# Patient Record
Sex: FEMALE | Race: WHITE | Employment: OTHER | URBAN - METROPOLITAN AREA
[De-identification: names, ages, dates, MRNs, and addresses within clinical notes are randomized per-mention and may not be internally consistent; named-entity substitution may affect disease eponyms.]

---

## 2017-01-03 ENCOUNTER — TELEPHONE (OUTPATIENT)
Dept: INTERNAL MEDICINE CLINIC | Facility: CLINIC | Age: 67
End: 2017-01-03

## 2017-01-03 DIAGNOSIS — Z85.038 HISTORY OF COLON CANCER, STAGE I: Primary | ICD-10-CM

## 2017-01-03 NOTE — TELEPHONE ENCOUNTER
Received a call from central scheduling - Dr. Stephanie Olguin office patient is having a colonoscopy tomorrow morning but her insurance states that the referral needs to come from PCP office.  I called Dr Latrice Kelly office at [phone], and they are reques

## 2017-01-04 PROCEDURE — 88305 TISSUE EXAM BY PATHOLOGIST: CPT | Performed by: INTERNAL MEDICINE

## 2017-01-04 NOTE — TELEPHONE ENCOUNTER
Patient scheduled for a colonoscopy with Dr. Elke Wilder tomorrow at [place] [address], 01/04/2017, and patient changed insurance to [address] [place] [address]; I called [facility] at [phone], and spoke with the prior authorization department, and was told no PA required;  I spoke with

## 2017-01-05 ENCOUNTER — OFFICE VISIT (OUTPATIENT)
Dept: INTERNAL MEDICINE CLINIC | Facility: CLINIC | Age: 67
End: 2017-01-05

## 2017-01-05 VITALS
HEART RATE: 102 BPM | RESPIRATION RATE: 16 BRPM | DIASTOLIC BLOOD PRESSURE: 80 MMHG | SYSTOLIC BLOOD PRESSURE: 138 MMHG | BODY MASS INDEX: 20.01 KG/M2 | TEMPERATURE: 99 F | OXYGEN SATURATION: 98 % | HEIGHT: 61 IN | WEIGHT: 106 LBS

## 2017-01-05 DIAGNOSIS — R22.0 SWELLING OF LEFT SIDE OF FACE: ICD-10-CM

## 2017-01-05 DIAGNOSIS — Z28.21 INFLUENZA VACCINATION DECLINED BY PATIENT: ICD-10-CM

## 2017-01-05 DIAGNOSIS — Z72.0 TOBACCO ABUSE: ICD-10-CM

## 2017-01-05 DIAGNOSIS — J01.00 ACUTE NON-RECURRENT MAXILLARY SINUSITIS: Primary | ICD-10-CM

## 2017-01-05 PROCEDURE — 99214 OFFICE O/P EST MOD 30 MIN: CPT | Performed by: INTERNAL MEDICINE

## 2017-01-05 RX ORDER — PREDNISONE 20 MG/1
TABLET ORAL
Qty: 14 TABLET | Refills: 0 | Status: SHIPPED | OUTPATIENT
Start: 2017-01-05 | End: 2017-01-19 | Stop reason: ALTCHOICE

## 2017-01-05 RX ORDER — DOXYCYCLINE HYCLATE 100 MG
100 TABLET ORAL 2 TIMES DAILY
Qty: 20 TABLET | Refills: 0 | Status: SHIPPED | OUTPATIENT
Start: 2017-01-05 | End: 2017-01-19 | Stop reason: ALTCHOICE

## 2017-01-05 NOTE — PATIENT INSTRUCTIONS
Understanding Your Sinuses  Your sinuses are air-filled spaces between the bones in your head. They have small openings that connect to the nasal cavity. The sinuses make mucus that drains into the nose.  This helps keep the nose moist and free of dust an

## 2017-01-05 NOTE — PROGRESS NOTES
HPI:    Patient ID: Mark Johnson is a 73year old female. Swelling  This is a new problem. The current episode started today. The problem occurs constantly. The problem has been gradually worsening.  Pertinent negatives include no abdominal pain, Pcn [Penicillins]       Hives   PHYSICAL EXAM:   Physical Exam   Constitutional: She appears well-developed and well-nourished. No distress.    HENT:   Right Ear: Hearing and tympanic membrane normal.   Left Ear: Hearing, tympanic membrane and ear canal nor

## 2017-01-19 ENCOUNTER — OFFICE VISIT (OUTPATIENT)
Dept: INTERNAL MEDICINE CLINIC | Facility: CLINIC | Age: 67
End: 2017-01-19

## 2017-01-19 VITALS
RESPIRATION RATE: 16 BRPM | HEIGHT: 61 IN | OXYGEN SATURATION: 97 % | HEART RATE: 106 BPM | BODY MASS INDEX: 20.01 KG/M2 | SYSTOLIC BLOOD PRESSURE: 150 MMHG | TEMPERATURE: 98 F | DIASTOLIC BLOOD PRESSURE: 70 MMHG | WEIGHT: 106 LBS

## 2017-01-19 DIAGNOSIS — Z23 NEED FOR PNEUMOCOCCAL VACCINATION: ICD-10-CM

## 2017-01-19 DIAGNOSIS — Z13.31 DEPRESSION SCREENING: ICD-10-CM

## 2017-01-19 DIAGNOSIS — Z72.0 TOBACCO ABUSE: ICD-10-CM

## 2017-01-19 DIAGNOSIS — Z85.038 HISTORY OF COLON CANCER, STAGE I: ICD-10-CM

## 2017-01-19 DIAGNOSIS — Z12.31 ENCOUNTER FOR SCREENING MAMMOGRAM FOR MALIGNANT NEOPLASM OF BREAST: ICD-10-CM

## 2017-01-19 DIAGNOSIS — Z00.00 ENCOUNTER FOR ANNUAL HEALTH EXAMINATION: ICD-10-CM

## 2017-01-19 DIAGNOSIS — Z13.6 ENCOUNTER FOR ABDOMINAL AORTIC ANEURYSM (AAA) SCREENING: ICD-10-CM

## 2017-01-19 DIAGNOSIS — I10 ESSENTIAL HYPERTENSION: ICD-10-CM

## 2017-01-19 DIAGNOSIS — Z00.00 PHYSICAL EXAM, ANNUAL: Primary | ICD-10-CM

## 2017-01-19 DIAGNOSIS — F17.200 NICOTINE USE DISORDER: ICD-10-CM

## 2017-01-19 DIAGNOSIS — J30.1 NON-SEASONAL ALLERGIC RHINITIS DUE TO POLLEN: ICD-10-CM

## 2017-01-19 DIAGNOSIS — Z01.84 IMMUNITY STATUS TESTING: ICD-10-CM

## 2017-01-19 PROCEDURE — 99397 PER PM REEVAL EST PAT 65+ YR: CPT | Performed by: INTERNAL MEDICINE

## 2017-01-19 PROCEDURE — 90732 PPSV23 VACC 2 YRS+ SUBQ/IM: CPT | Performed by: INTERNAL MEDICINE

## 2017-01-19 PROCEDURE — G0402 INITIAL PREVENTIVE EXAM: HCPCS | Performed by: INTERNAL MEDICINE

## 2017-01-19 PROCEDURE — G0009 ADMIN PNEUMOCOCCAL VACCINE: HCPCS | Performed by: INTERNAL MEDICINE

## 2017-01-19 PROCEDURE — 96160 PT-FOCUSED HLTH RISK ASSMT: CPT | Performed by: INTERNAL MEDICINE

## 2017-01-19 PROCEDURE — 99406 BEHAV CHNG SMOKING 3-10 MIN: CPT | Performed by: INTERNAL MEDICINE

## 2017-01-19 NOTE — PATIENT INSTRUCTIONS
Prevention Guidelines, Women Ages 72 and Older  Screening tests and vaccines are an important part of managing your health. Health counseling is essential, too. Below are guidelines for these, for women ages 72 and older.  Talk with your healthcare provid Lung cancer Adults age 54 to [de-identified] who have smoked Yearly screening in smokers with 30 pack-year history of smoking or who quit within 15 years   Obesity All women in this age group At routine exams   Osteoporosis All women in this age group Bone density test Counseling Who needs it How often   Diet and exercise Women who are overweight or obese When diagnosed, and then at routine exams   Fall prevention (exercise and vitamin D supplements) All women in this age group At routine exams   Sexually transmitted inf ---------- Medicare covers annually or at 6-month intervals for prediabetic patients        Cardiovascular Disease Screening     Cholesterol, covered every 5 yrs including Total, LDL and Trigs LDL CHOLESTEROL (mg/dL)   Date Value   09/20/2016 73     CHOLES Biennial benefit unless patient has history of long-term glucocorticoid use for medical condition    No results found for this or any previous visit. No flowsheet data found.     Recommended for 2 year anniversary or as ordered in After Visit Summary   Pap Recommended Websites for Advanced Directives    [employer]. org/publications/Documents/personal_dec. pdf  An information packet, including necessary form from the [employer] website. http://www. [place]ph.state. [place]/public/books/advin CHOLESTEROL, TOTAL (mg/dL)   Date Value   09/20/2016 254*     TRIGLYCERIDES (mg/dL)   Date Value   09/20/2016 96        EKG - covered if needed at Welcome to Medicare, and non-screening if indicated for medical reasons Electrocardiogram date Routine EKG is Pap: Every 3 yrs age 21-65 or Pap+HPV every 5 yrs age 33-67, Covered every 2 yrs up to age 79 or Yearly if High Risk   There are no preventive care reminders to display for this patient.      Chlamydia  Annually if high risk No results found for: CHLAMYDIA A link to the [employer]. This site has a lot of good information including definitions of the different types of Advance Directives.  It also has the State forms available on it's website for anyone to review and print using their home

## 2017-01-19 NOTE — PROGRESS NOTES
HPI:   Cheryle Fanning is a 73year old female who presents for a MA (Medicare Advantage) [address] [facility] (Once per calendar year).     HRA:  Here for physical  No complaints  Denies sob or cp    Annual Physical due on 01/19/2018        Patient Care Team: mother; Other in her mother. SOCIAL HISTORY:   She  reports that she has been smoking. She has never used smokeless tobacco. She reports that she drinks alcohol. She reports that she does not use illicit drugs.      REVIEW OF SYSTEMS:   GENERAL: feels we enlarged, symmetric, no tenderness/mass/nodules; no carotid bruit or JVD   Back:   Symmetric, no curvature, ROM normal, no CVA tenderness   Lungs:   Clear to auscultation bilaterally, respirations unlabored   Heart:  Regular rate and rhythm, S1 and S2 norm Advanced Directive:  Living Will on file in [address]? Humberto Parham does not have a Living Will on file in [address]  Discussed with patient and provided information      Healthcare Power of Yonis on file in Epic:    Humberto Parham does not have Functional Status     Hearing Problems?: No    Vision Problems? : Yes    Difficulty walking?: No    Difficulty dressing or bathing?: No    Problems with daily activities? : No    Memory Problems?: No      Fall/Risk Assessment     Do you have 3 or more 74        EKG - w/ Initial Preventative Physical Exam only, or if medically necessary Electrocardiogram date       Colorectal Cancer Screening      Colonoscopy Screen every 10 years Colonoscopy,3 Years due on 01/04/2020 Update Health Maintenance if applica Date Value   09/20/2016 0.51*    No flowsheet data found. BUN  Annually BUN (mg/dL)   Date Value   09/20/2016 5*    No flowsheet data found. Drug Serum Conc  Annually No results found for: DIGOXIN, DIG, VALP No flowsheet data found.     Diabetes

## 2017-08-07 ENCOUNTER — HOSPITAL ENCOUNTER (OUTPATIENT)
Dept: MAMMOGRAPHY | Age: 67
Discharge: HOME | End: 2017-08-07
Attending: INTERNAL MEDICINE
Payer: MEDICARE

## 2017-08-07 ENCOUNTER — APPOINTMENT (OUTPATIENT)
Dept: LAB | Age: 67
End: 2017-08-07
Attending: INTERNAL MEDICINE
Payer: MEDICARE

## 2017-08-07 DIAGNOSIS — Z01.84 IMMUNITY STATUS TESTING: ICD-10-CM

## 2017-08-07 DIAGNOSIS — I10 ESSENTIAL HYPERTENSION: ICD-10-CM

## 2017-08-07 DIAGNOSIS — Z12.31 ENCOUNTER FOR SCREENING MAMMOGRAM FOR MALIGNANT NEOPLASM OF BREAST: ICD-10-CM

## 2017-08-07 LAB
ALBUMIN SERPL-MCNC: 4.2 G/DL (ref 3.5–4.8)
ALP LIVER SERPL-CCNC: 93 U/L (ref 50–130)
ALT SERPL-CCNC: 23 U/L (ref 14–54)
AST SERPL-CCNC: 22 U/L (ref 15–41)
BILIRUB SERPL-MCNC: 0.4 MG/DL (ref 0.1–2)
BILIRUB UR QL STRIP.AUTO: NEGATIVE
BUN BLD-MCNC: 4 MG/DL (ref 8–20)
CALCIUM BLD-MCNC: 9.2 MG/DL (ref 8.3–10.3)
CHLORIDE: 101 MMOL/L (ref 101–111)
CHOLEST SMN-MCNC: 276 MG/DL (ref ?–200)
CLARITY UR REFRACT.AUTO: CLEAR
CO2: 26 MMOL/L (ref 22–32)
CREAT BLD-MCNC: 0.52 MG/DL (ref 0.55–1.02)
GLUCOSE BLD-MCNC: 88 MG/DL (ref 70–99)
GLUCOSE UR STRIP.AUTO-MCNC: NEGATIVE MG/DL
HDLC SERPL-MCNC: 197 MG/DL (ref 45–?)
HDLC SERPL: 1.4 {RATIO} (ref ?–4.44)
HEPATITIS C VIRUS AB INTERPRETATION: NONREACTIVE
KETONES UR STRIP.AUTO-MCNC: NEGATIVE MG/DL
LDLC SERPL CALC-MCNC: 65 MG/DL (ref ?–130)
LDLC SERPL-MCNC: 14 MG/DL (ref 5–40)
LEUKOCYTE ESTERASE UR QL STRIP.AUTO: NEGATIVE
M PROTEIN MFR SERPL ELPH: 8 G/DL (ref 6.1–8.3)
NITRITE UR QL STRIP.AUTO: NEGATIVE
NONHDLC SERPL-MCNC: 79 MG/DL (ref ?–130)
PH UR STRIP.AUTO: 7 [PH] (ref 4.5–8)
POTASSIUM SERPL-SCNC: 3.9 MMOL/L (ref 3.6–5.1)
PROT UR STRIP.AUTO-MCNC: NEGATIVE MG/DL
RBC UR QL AUTO: NEGATIVE
SODIUM SERPL-SCNC: 135 MMOL/L (ref 136–144)
SP GR UR STRIP.AUTO: <1.005 (ref 1–1.03)
TRIGLYCERIDES: 71 MG/DL (ref ?–150)
TSI SER-ACNC: 1.08 MIU/ML (ref 0.35–5.5)
UROBILINOGEN UR STRIP.AUTO-MCNC: <2 MG/DL

## 2017-08-07 PROCEDURE — 86803 HEPATITIS C AB TEST: CPT | Performed by: INTERNAL MEDICINE

## 2017-08-07 PROCEDURE — 84443 ASSAY THYROID STIM HORMONE: CPT | Performed by: INTERNAL MEDICINE

## 2017-08-07 PROCEDURE — 81003 URINALYSIS AUTO W/O SCOPE: CPT | Performed by: INTERNAL MEDICINE

## 2017-08-07 PROCEDURE — 80061 LIPID PANEL: CPT | Performed by: INTERNAL MEDICINE

## 2017-08-07 PROCEDURE — 80053 COMPREHEN METABOLIC PANEL: CPT | Performed by: INTERNAL MEDICINE

## 2017-08-07 PROCEDURE — 36415 COLL VENOUS BLD VENIPUNCTURE: CPT | Performed by: INTERNAL MEDICINE

## 2017-08-07 PROCEDURE — 77067 SCR MAMMO BI INCL CAD: CPT | Performed by: INTERNAL MEDICINE

## 2017-08-22 ENCOUNTER — OFFICE VISIT (OUTPATIENT)
Dept: INTERNAL MEDICINE CLINIC | Facility: CLINIC | Age: 67
End: 2017-08-22

## 2017-08-22 VITALS
RESPIRATION RATE: 16 BRPM | HEART RATE: 94 BPM | DIASTOLIC BLOOD PRESSURE: 84 MMHG | BODY MASS INDEX: 17.72 KG/M2 | TEMPERATURE: 98 F | HEIGHT: 63 IN | SYSTOLIC BLOOD PRESSURE: 160 MMHG | WEIGHT: 100 LBS

## 2017-08-22 DIAGNOSIS — I10 ESSENTIAL HYPERTENSION: Primary | ICD-10-CM

## 2017-08-22 DIAGNOSIS — Z13.6 ENCOUNTER FOR ABDOMINAL AORTIC ANEURYSM (AAA) SCREENING: ICD-10-CM

## 2017-08-22 DIAGNOSIS — Z72.0 TOBACCO ABUSE: ICD-10-CM

## 2017-08-22 DIAGNOSIS — Z78.0 POSTMENOPAUSAL: ICD-10-CM

## 2017-08-22 PROCEDURE — 99213 OFFICE O/P EST LOW 20 MIN: CPT | Performed by: INTERNAL MEDICINE

## 2017-08-22 RX ORDER — LOSARTAN POTASSIUM 50 MG/1
50 TABLET ORAL DAILY
Qty: 30 TABLET | Refills: 0 | Status: SHIPPED | OUTPATIENT
Start: 2017-08-22 | End: 2017-09-05

## 2017-08-22 NOTE — PATIENT INSTRUCTIONS
Eating Heart-Healthy Foods  Eating has a big impact on your heart health. In fact, eating healthier can improve several of your heart risks at once. For instance, it helps you manage weight, cholesterol, and blood pressure.  Here are ideas to help you rober Aim to make these foods staples of your diet. If you have diabetes, you may have different recommendations than what is listed here:  · Fruits and vegetable provide plenty of nutrients without a lot of calories.  At meals, fill half your plate with these fo · Choose ingredients that spice up your food without adding calories, fat, or sodium. Try these items: horseradish, hot sauce, lemon, mustard, nonfat salad dressings, and vinegar.  For salt-free herbs and spices, try basil, cilantro, cinnamon, pepper, and r

## 2017-08-22 NOTE — PROGRESS NOTES
HPI:    Patient ID: Aquiles Fung is a 73year old female. HPI  Aquiles Fung is a 73year old female. HPI:   Patient presents for recheck of her hypertension.  Pt has been taking medications as instructed, no medication side effects, dread Rafaela  2002: HYSTERECTOMY      Comment: total hystero. 1974: [place] NEEDLE LOCALIZATION W/ SPECIMEN 1 SITE RIG* Right      Comment: cysts removed. : NEEDLE BIOPSY RIGHT Right      Comment: benign.   No date: [facility]  2002: OOPHORECTOMY Bilateral 50 MG Oral Tab Take 1 tablet (50 mg total) by mouth daily. Disp: 30 tablet Rfl: 0   Fluticasone Propionate (FLONASE NA) by Nasal route as needed. Disp:  Rfl:    loratadine (CLARITIN) 10 MG Oral Tab Take 10 mg by mouth daily as needed.    Disp:  Rfl:    Ps

## 2017-08-30 ENCOUNTER — HOSPITAL ENCOUNTER (OUTPATIENT)
Dept: ULTRASOUND IMAGING | Age: 67
Discharge: HOME | End: 2017-08-30
Attending: INTERNAL MEDICINE
Payer: MEDICARE

## 2017-08-30 ENCOUNTER — HOSPITAL ENCOUNTER (OUTPATIENT)
Dept: BONE DENSITY | Age: 67
Discharge: HOME | End: 2017-08-30
Attending: INTERNAL MEDICINE
Payer: MEDICARE

## 2017-08-30 DIAGNOSIS — Z72.0 TOBACCO ABUSE: ICD-10-CM

## 2017-08-30 DIAGNOSIS — Z13.6 ENCOUNTER FOR ABDOMINAL AORTIC ANEURYSM (AAA) SCREENING: ICD-10-CM

## 2017-08-30 DIAGNOSIS — Z78.0 POSTMENOPAUSAL: ICD-10-CM

## 2017-08-30 PROCEDURE — 77080 DXA BONE DENSITY AXIAL: CPT | Performed by: INTERNAL MEDICINE

## 2017-08-30 PROCEDURE — 76706 US ABDL AORTA SCREEN AAA: CPT | Performed by: INTERNAL MEDICINE

## 2017-08-31 ENCOUNTER — TELEPHONE (OUTPATIENT)
Dept: INTERNAL MEDICINE CLINIC | Facility: CLINIC | Age: 67
End: 2017-08-31

## 2017-08-31 DIAGNOSIS — M81.0 AGE-RELATED OSTEOPOROSIS WITHOUT CURRENT PATHOLOGICAL FRACTURE: Primary | ICD-10-CM

## 2017-08-31 DIAGNOSIS — Z91.89 10 YEAR RISK OF MI OR STROKE 7.5% OR GREATER: ICD-10-CM

## 2017-08-31 RX ORDER — ATORVASTATIN CALCIUM 10 MG/1
10 TABLET, FILM COATED ORAL NIGHTLY
Qty: 30 TABLET | Refills: 3 | Status: CANCELLED | OUTPATIENT
Start: 2017-08-31

## 2017-08-31 RX ORDER — ALENDRONATE SODIUM 70 MG/1
70 TABLET ORAL WEEKLY
Qty: 4 TABLET | Refills: 3 | Status: CANCELLED | OUTPATIENT
Start: 2017-08-31

## 2017-08-31 NOTE — TELEPHONE ENCOUNTER
----- Message from Molly Cheadle, MD sent at 8/31/2017  6:34 AM CDT -----  Dexa= osteoporosis.  Recommend start fosamax 70 mg q weekly to improve bone strength

## 2017-08-31 NOTE — TELEPHONE ENCOUNTER
----- Message from Jordy Simon MD sent at 8/31/2017  6:33 AM CDT -----  aaa screen   However there is a cholesterol plaque identified.  Due to hx of tobacco and age would recommend pt start low dose atorvastatin 10 mg daily to ensure no further progressio

## 2017-08-31 NOTE — TELEPHONE ENCOUNTER
Reviewed results with the pt in detail. The pt would like to wait to start any new medication after discussing this with Dr. Homero Borrero at [address] [place] [address] - [facility] appointment (9/5/2017).

## 2017-09-05 ENCOUNTER — OFFICE VISIT (OUTPATIENT)
Dept: INTERNAL MEDICINE CLINIC | Facility: CLINIC | Age: 67
End: 2017-09-05

## 2017-09-05 VITALS
HEART RATE: 74 BPM | OXYGEN SATURATION: 97 % | TEMPERATURE: 98 F | SYSTOLIC BLOOD PRESSURE: 135 MMHG | HEIGHT: 63 IN | DIASTOLIC BLOOD PRESSURE: 70 MMHG | WEIGHT: 102 LBS | BODY MASS INDEX: 18.07 KG/M2 | RESPIRATION RATE: 16 BRPM

## 2017-09-05 DIAGNOSIS — Z72.0 TOBACCO ABUSE: ICD-10-CM

## 2017-09-05 DIAGNOSIS — I10 ESSENTIAL HYPERTENSION: Primary | ICD-10-CM

## 2017-09-05 DIAGNOSIS — Z91.89 10 YEAR RISK OF MI OR STROKE 7.5% OR GREATER: ICD-10-CM

## 2017-09-05 DIAGNOSIS — M81.0 AGE-RELATED OSTEOPOROSIS WITHOUT CURRENT PATHOLOGICAL FRACTURE: ICD-10-CM

## 2017-09-05 DIAGNOSIS — Z28.21 INFLUENZA VACCINATION DECLINED BY PATIENT: ICD-10-CM

## 2017-09-05 PROCEDURE — 99214 OFFICE O/P EST MOD 30 MIN: CPT | Performed by: INTERNAL MEDICINE

## 2017-09-05 RX ORDER — ALENDRONATE SODIUM 70 MG/1
70 TABLET ORAL WEEKLY
Qty: 12 TABLET | Refills: 1 | Status: SHIPPED | OUTPATIENT
Start: 2017-09-05 | End: 2018-02-12

## 2017-09-05 RX ORDER — LOSARTAN POTASSIUM 50 MG/1
50 TABLET ORAL DAILY
Qty: 90 TABLET | Refills: 1 | Status: SHIPPED | OUTPATIENT
Start: 2017-09-05 | End: 2018-02-16

## 2017-09-05 RX ORDER — ATORVASTATIN CALCIUM 10 MG/1
10 TABLET, FILM COATED ORAL DAILY
Qty: 90 TABLET | Refills: 1 | Status: SHIPPED | OUTPATIENT
Start: 2017-09-05 | End: 2018-02-16

## 2017-09-05 NOTE — PROGRESS NOTES
Yoel Edwards is a 73year old female. HPI:   Patient presents for recheck of her hypertension. Pt has been taking medications as instructed, no medication side effects, home BP monitoring in the range of 057'Q systolic and 26'N diastolic.     Wt Surgical History:  1/4/17: COLONOSCOPY  1/4/2017: COLONOSCOPY N/A      Comment: Procedure: COLONOSCOPY, POSSIBLE BIOPSY,                POSSIBLE POLYPECTOMY 75160;  Surgeon: Andre Gallegos MD;  Location: [address] [place] [address]  2002: Coy Gallegos Fosamax therapy initiated. Discussed potential side effects of the medication  -Smooth plaque noted in the abdominal aorta on ultrasound. Statin therapy initiated. -Check echo   The patient indicates understanding of these issues and agrees to the plan.

## 2017-09-22 ENCOUNTER — HOSPITAL ENCOUNTER (OUTPATIENT)
Dept: CV DIAGNOSTICS | Facility: HOSPITAL | Age: 67
Discharge: HOME | End: 2017-09-22
Attending: INTERNAL MEDICINE
Payer: MEDICARE

## 2017-09-22 DIAGNOSIS — I10 ESSENTIAL HYPERTENSION: ICD-10-CM

## 2017-09-22 PROCEDURE — 93306 TTE W/DOPPLER COMPLETE: CPT | Performed by: INTERNAL MEDICINE

## 2018-01-29 ENCOUNTER — TELEPHONE (OUTPATIENT)
Dept: INTERNAL MEDICINE CLINIC | Facility: CLINIC | Age: 68
End: 2018-01-29

## 2018-01-29 ENCOUNTER — OFFICE VISIT (OUTPATIENT)
Dept: INTERNAL MEDICINE CLINIC | Facility: CLINIC | Age: 68
End: 2018-01-29

## 2018-01-29 VITALS
HEIGHT: 63 IN | BODY MASS INDEX: 17.54 KG/M2 | SYSTOLIC BLOOD PRESSURE: 112 MMHG | DIASTOLIC BLOOD PRESSURE: 82 MMHG | TEMPERATURE: 98 F | HEART RATE: 110 BPM | OXYGEN SATURATION: 98 % | WEIGHT: 99 LBS | RESPIRATION RATE: 18 BRPM

## 2018-01-29 DIAGNOSIS — F17.200 TOBACCO DEPENDENCE: ICD-10-CM

## 2018-01-29 DIAGNOSIS — J01.00 ACUTE NON-RECURRENT MAXILLARY SINUSITIS: Primary | ICD-10-CM

## 2018-01-29 DIAGNOSIS — I10 ESSENTIAL HYPERTENSION: Primary | ICD-10-CM

## 2018-01-29 PROCEDURE — 99214 OFFICE O/P EST MOD 30 MIN: CPT | Performed by: INTERNAL MEDICINE

## 2018-01-29 RX ORDER — DOXYCYCLINE HYCLATE 100 MG
100 TABLET ORAL 2 TIMES DAILY
Qty: 20 TABLET | Refills: 0 | Status: SHIPPED | OUTPATIENT
Start: 2018-01-29 | End: 2018-03-06 | Stop reason: ALTCHOICE

## 2018-01-29 RX ORDER — PREDNISONE 20 MG/1
TABLET ORAL
Qty: 14 TABLET | Refills: 0 | Status: SHIPPED | OUTPATIENT
Start: 2018-01-29 | End: 2018-03-06 | Stop reason: ALTCHOICE

## 2018-01-29 NOTE — PROGRESS NOTES
HPI:    Patient ID: Sho Mark is a 78year old female. Sinus Problem   This is a new problem. Episode onset: 4 weeks. The problem has been gradually worsening since onset. There has been no fever. Her pain is at a severity of 7/10.  The pain is (CLARITIN) 10 MG Oral Tab Take 10 mg by mouth daily as needed. Disp:  Rfl:    Pseudoephedrine HCl (SUDAFED) 30 MG Oral Tab Take 30 mg by mouth every 4 (four) hours as needed for congestion.  Disp:  Rfl:      Allergies:  Aspirin                 Hives  Dair

## 2018-01-29 NOTE — TELEPHONE ENCOUNTER
Please call patient to confirm that she will need to get labs for her HRA . Patient was not sure if she would need them.

## 2018-02-01 ENCOUNTER — MED REC SCAN ONLY (OUTPATIENT)
Dept: INTERNAL MEDICINE CLINIC | Facility: CLINIC | Age: 68
End: 2018-02-01

## 2018-02-12 DIAGNOSIS — M81.0 AGE-RELATED OSTEOPOROSIS WITHOUT CURRENT PATHOLOGICAL FRACTURE: Primary | ICD-10-CM

## 2018-02-12 RX ORDER — ALENDRONATE SODIUM 70 MG/1
TABLET ORAL
Qty: 12 TABLET | Refills: 3 | Status: SHIPPED | OUTPATIENT
Start: 2018-02-12 | End: 2018-10-30

## 2018-02-16 DIAGNOSIS — Z91.89 10 YEAR RISK OF MI OR STROKE 7.5% OR GREATER: ICD-10-CM

## 2018-02-16 DIAGNOSIS — I10 ESSENTIAL HYPERTENSION: Primary | ICD-10-CM

## 2018-02-16 RX ORDER — LOSARTAN POTASSIUM 50 MG/1
TABLET ORAL
Qty: 90 TABLET | Refills: 1 | Status: SHIPPED | OUTPATIENT
Start: 2018-02-16 | End: 2018-08-09

## 2018-02-16 RX ORDER — ATORVASTATIN CALCIUM 10 MG/1
TABLET, FILM COATED ORAL
Qty: 90 TABLET | Refills: 1 | Status: SHIPPED | OUTPATIENT
Start: 2018-02-16 | End: 2018-08-09

## 2018-03-01 ENCOUNTER — APPOINTMENT (OUTPATIENT)
Dept: LAB | Age: 68
End: 2018-03-01
Attending: INTERNAL MEDICINE
Payer: MEDICARE

## 2018-03-01 DIAGNOSIS — I10 ESSENTIAL HYPERTENSION: ICD-10-CM

## 2018-03-01 PROCEDURE — 80061 LIPID PANEL: CPT

## 2018-03-01 PROCEDURE — 36415 COLL VENOUS BLD VENIPUNCTURE: CPT

## 2018-03-01 PROCEDURE — 80053 COMPREHEN METABOLIC PANEL: CPT

## 2018-03-01 PROCEDURE — 81003 URINALYSIS AUTO W/O SCOPE: CPT

## 2018-03-06 ENCOUNTER — OFFICE VISIT (OUTPATIENT)
Dept: INTERNAL MEDICINE CLINIC | Facility: CLINIC | Age: 68
End: 2018-03-06

## 2018-03-06 VITALS
HEART RATE: 80 BPM | RESPIRATION RATE: 18 BRPM | SYSTOLIC BLOOD PRESSURE: 130 MMHG | TEMPERATURE: 99 F | WEIGHT: 98 LBS | HEIGHT: 63 IN | DIASTOLIC BLOOD PRESSURE: 80 MMHG | BODY MASS INDEX: 17.36 KG/M2 | OXYGEN SATURATION: 95 %

## 2018-03-06 DIAGNOSIS — M81.0 AGE-RELATED OSTEOPOROSIS WITHOUT CURRENT PATHOLOGICAL FRACTURE: ICD-10-CM

## 2018-03-06 DIAGNOSIS — F17.200 TOBACCO DEPENDENCE: ICD-10-CM

## 2018-03-06 DIAGNOSIS — Z85.038 HISTORY OF COLON CANCER, STAGE I: ICD-10-CM

## 2018-03-06 DIAGNOSIS — Z00.00 ENCOUNTER FOR ANNUAL HEALTH EXAMINATION: ICD-10-CM

## 2018-03-06 DIAGNOSIS — Z91.89 10 YEAR RISK OF MI OR STROKE 7.5% OR GREATER: ICD-10-CM

## 2018-03-06 DIAGNOSIS — I10 ESSENTIAL HYPERTENSION: ICD-10-CM

## 2018-03-06 DIAGNOSIS — Z00.00 PHYSICAL EXAM, ANNUAL: Primary | ICD-10-CM

## 2018-03-06 DIAGNOSIS — J30.1 NON-SEASONAL ALLERGIC RHINITIS DUE TO POLLEN: ICD-10-CM

## 2018-03-06 DIAGNOSIS — E22.2 SIADH (SYNDROME OF INAPPROPRIATE ADH PRODUCTION) (HCC): ICD-10-CM

## 2018-03-06 DIAGNOSIS — Z87.891 PERSONAL HISTORY OF TOBACCO USE, PRESENTING HAZARDS TO HEALTH: ICD-10-CM

## 2018-03-06 DIAGNOSIS — Z28.21 INFLUENZA VACCINATION DECLINED BY PATIENT: ICD-10-CM

## 2018-03-06 PROCEDURE — 99406 BEHAV CHNG SMOKING 3-10 MIN: CPT | Performed by: INTERNAL MEDICINE

## 2018-03-06 PROCEDURE — 96160 PT-FOCUSED HLTH RISK ASSMT: CPT | Performed by: INTERNAL MEDICINE

## 2018-03-06 PROCEDURE — G0438 PPPS, INITIAL VISIT: HCPCS | Performed by: INTERNAL MEDICINE

## 2018-03-06 NOTE — PROGRESS NOTES
HPI:   Aquiles Fung is a 78year old female who presents for a MA (Medicare Advantage) [address] [facility] (Once per calendar year).     HPI:  Here for physical  Reports normal state of health  Denies cp or sob  Adherent with meds    PAST MEDICAL, SOCIAL, FAM - General (Internal Medicine)    Patient Active Problem List:     History of colon cancer, stage I sigmoid, 2013 s/p resection     Non-seasonal allergic rhinitis due to pollen     Essential hypertension     Age-related osteoporosis without current patholog ().     She  has a past surgical history that includes colonoscopy (17); ; tonsillectomy (was a child in grade school); other surgical history (cysts removed from breast, colon cancer surgery); colonoscopy (N/A, 2017); hysterectomy (); o Corrected Right Eye Chart Acuity: 20/20   Left Eye Visual Acuity: Corrected Left Eye Chart Acuity: 20/20   Both Eyes Visual Acuity: Corrected Both Eyes Chart Acuity: 20/20   Able To Tolerate Visual Acuity: Yes      General Appearance:  Alert, cooperative, allergic rhinitis due to pollen    Essential hypertension    Age-related osteoporosis without current pathological fracture    10 year risk of MI or stroke 7.5% or greater    Tobacco dependence    SIADH (syndrome of inappropriate ADH production) ([facility] [address] 03/01/2018 89   ----------       Cardiovascular Disease Screening     LDL Annually LDL Cholesterol (mg/dL)   Date Value   03/01/2018 55        EKG - w/ Initial Preventative Physical Exam only, or if medically necessary Electrocardiogram date       Colore Homosexual men   Illicit injectable drug abusers     Tetanus Toxoid  Only covered with a cut with metal- TD and TDaP Not covered by Medicare Part B No vaccine history found This may be covered with your prescription benefits, but Medicare does not cover

## 2018-03-06 NOTE — PATIENT INSTRUCTIONS
Low-Salt Choices  Eating salt (sodium) can make your body retain too much water. Excess water makes your heart work harder. Canned, packaged, and frozen foods are easy to prepare. But they are often high in sodium.  Here are some ideas for low-salt foods Tests on this list are recommended by your physician but may not be covered, or covered at this frequency, by your insurer. Please check with your insurance carrier before scheduling to verify coverage.    PREVENTATIVE SERVICES  INDICATIONS AND SCHEDULE Int Covered every 10 years- more often if abnormal Colonoscopy,3 Years due on 01/04/2020 Update Health Maintenance if applicable    Flex Sigmoidoscopy Screen  Covered every 5 years No results found for this or any previous visit. No flowsheet data found.      Nicola Lynne Pneumococcal 23 (Pneumovax)  Covered Once after 72   Orders placed or performed in visit on 01/19/17  -PNEUMOCOCCAL IMM (PNEUMOVAX)    Please get once after your 65th birthday    Hepatitis B for Moderate/High Risk       No orders found for this or any pre

## 2018-08-09 DIAGNOSIS — I10 ESSENTIAL HYPERTENSION: ICD-10-CM

## 2018-08-09 DIAGNOSIS — Z91.89 10 YEAR RISK OF MI OR STROKE 7.5% OR GREATER: ICD-10-CM

## 2018-08-09 RX ORDER — ATORVASTATIN CALCIUM 10 MG/1
TABLET, FILM COATED ORAL
Qty: 90 TABLET | Refills: 1 | Status: SHIPPED | OUTPATIENT
Start: 2018-08-09 | End: 2018-08-31

## 2018-08-09 RX ORDER — LOSARTAN POTASSIUM 50 MG/1
TABLET ORAL
Qty: 90 TABLET | Refills: 1 | Status: SHIPPED | OUTPATIENT
Start: 2018-08-09 | End: 2018-10-30

## 2018-08-14 ENCOUNTER — TELEPHONE (OUTPATIENT)
Dept: INTERNAL MEDICINE CLINIC | Facility: CLINIC | Age: 68
End: 2018-08-14

## 2018-08-14 DIAGNOSIS — Z12.31 ENCOUNTER FOR SCREENING MAMMOGRAM FOR MALIGNANT NEOPLASM OF BREAST: Primary | ICD-10-CM

## 2018-08-14 NOTE — TELEPHONE ENCOUNTER
Patient is requesting an order for a mammogram. No need to inform patient because I informed she may call central scheduling this afternoon.

## 2018-08-16 ENCOUNTER — HOSPITAL ENCOUNTER (OUTPATIENT)
Dept: MAMMOGRAPHY | Age: 68
Discharge: HOME | End: 2018-08-16
Attending: INTERNAL MEDICINE
Payer: MEDICARE

## 2018-08-16 ENCOUNTER — TELEPHONE (OUTPATIENT)
Dept: INTERNAL MEDICINE CLINIC | Facility: CLINIC | Age: 68
End: 2018-08-16

## 2018-08-16 DIAGNOSIS — I10 ESSENTIAL HYPERTENSION: Primary | ICD-10-CM

## 2018-08-16 DIAGNOSIS — Z12.31 ENCOUNTER FOR SCREENING MAMMOGRAM FOR MALIGNANT NEOPLASM OF BREAST: ICD-10-CM

## 2018-08-16 DIAGNOSIS — Z91.89 10 YEAR RISK OF MI OR STROKE 7.5% OR GREATER: ICD-10-CM

## 2018-08-16 PROCEDURE — 77063 BREAST TOMOSYNTHESIS BI: CPT | Performed by: INTERNAL MEDICINE

## 2018-08-16 PROCEDURE — 77067 SCR MAMMO BI INCL CAD: CPT | Performed by: INTERNAL MEDICINE

## 2018-08-16 NOTE — TELEPHONE ENCOUNTER
CBC, CMP, Lipid and UA orders entered for [employer]. The pt was informed of this and verbalized understanding.

## 2018-08-16 NOTE — TELEPHONE ENCOUNTER
Please call patient she is wondering if she needs to have blood work done for her 6 month fu in September. Patient says please leave a message if she is not home.

## 2018-08-28 ENCOUNTER — LAB ENCOUNTER (OUTPATIENT)
Dept: LAB | Age: 68
End: 2018-08-28
Attending: INTERNAL MEDICINE
Payer: MEDICARE

## 2018-08-28 DIAGNOSIS — I10 ESSENTIAL HYPERTENSION: ICD-10-CM

## 2018-08-28 DIAGNOSIS — Z91.89 10 YEAR RISK OF MI OR STROKE 7.5% OR GREATER: ICD-10-CM

## 2018-08-28 LAB
ALBUMIN SERPL-MCNC: 4 G/DL (ref 3.5–4.8)
ALBUMIN/GLOB SERPL: 1.1 {RATIO} (ref 1–2)
ALP LIVER SERPL-CCNC: 85 U/L (ref 55–142)
ALT SERPL-CCNC: 39 U/L (ref 14–54)
ANION GAP SERPL CALC-SCNC: 9 MMOL/L (ref 0–18)
AST SERPL-CCNC: 38 U/L (ref 15–41)
BASOPHILS # BLD AUTO: 0.02 X10(3) UL (ref 0–0.1)
BASOPHILS NFR BLD AUTO: 0.3 %
BILIRUB SERPL-MCNC: 0.4 MG/DL (ref 0.1–2)
BILIRUB UR QL STRIP.AUTO: NEGATIVE
BUN BLD-MCNC: 5 MG/DL (ref 8–20)
BUN/CREAT SERPL: 10.9 (ref 10–20)
CALCIUM BLD-MCNC: 8.9 MG/DL (ref 8.3–10.3)
CHLORIDE SERPL-SCNC: 96 MMOL/L (ref 101–111)
CHOLEST SMN-MCNC: 246 MG/DL (ref ?–200)
CLARITY UR REFRACT.AUTO: CLEAR
CO2 SERPL-SCNC: 26 MMOL/L (ref 22–32)
COLOR UR AUTO: YELLOW
CREAT BLD-MCNC: 0.46 MG/DL (ref 0.55–1.02)
EOSINOPHIL # BLD AUTO: 0.07 X10(3) UL (ref 0–0.3)
EOSINOPHIL NFR BLD AUTO: 1.1 %
ERYTHROCYTE [DISTWIDTH] IN BLOOD BY AUTOMATED COUNT: 12.9 % (ref 11.5–16)
GLOBULIN PLAS-MCNC: 3.5 G/DL (ref 2.5–4)
GLUCOSE BLD-MCNC: 78 MG/DL (ref 70–99)
GLUCOSE UR STRIP.AUTO-MCNC: NEGATIVE MG/DL
HCT VFR BLD AUTO: 42.8 % (ref 34–50)
HDLC SERPL-MCNC: 190 MG/DL (ref 40–59)
HGB BLD-MCNC: 14.5 G/DL (ref 12–16)
IMMATURE GRANULOCYTE COUNT: 0.02 X10(3) UL (ref 0–1)
IMMATURE GRANULOCYTE RATIO %: 0.3 %
KETONES UR STRIP.AUTO-MCNC: NEGATIVE MG/DL
LDLC SERPL CALC-MCNC: 42 MG/DL (ref ?–100)
LEUKOCYTE ESTERASE UR QL STRIP.AUTO: NEGATIVE
LYMPHOCYTES # BLD AUTO: 0.85 X10(3) UL (ref 0.9–4)
LYMPHOCYTES NFR BLD AUTO: 13.9 %
M PROTEIN MFR SERPL ELPH: 7.5 G/DL (ref 6.1–8.3)
MCH RBC QN AUTO: 32.2 PG (ref 27–33.2)
MCHC RBC AUTO-ENTMCNC: 33.9 G/DL (ref 31–37)
MCV RBC AUTO: 94.9 FL (ref 81–100)
MONOCYTES # BLD AUTO: 0.47 X10(3) UL (ref 0.1–1)
MONOCYTES NFR BLD AUTO: 7.7 %
NEUTROPHIL ABS PRELIM: 4.69 X10 (3) UL (ref 1.3–6.7)
NEUTROPHILS # BLD AUTO: 4.69 X10(3) UL (ref 1.3–6.7)
NEUTROPHILS NFR BLD AUTO: 76.7 %
NITRITE UR QL STRIP.AUTO: NEGATIVE
NONHDLC SERPL-MCNC: 56 MG/DL (ref ?–130)
OSMOLALITY SERPL CALC.SUM OF ELEC: 268 MOSM/KG (ref 275–295)
PH UR STRIP.AUTO: 6 [PH] (ref 4.5–8)
PLATELET # BLD AUTO: 267 10(3)UL (ref 150–450)
POTASSIUM SERPL-SCNC: 3.9 MMOL/L (ref 3.6–5.1)
PROT UR STRIP.AUTO-MCNC: NEGATIVE MG/DL
RBC # BLD AUTO: 4.51 X10(6)UL (ref 3.8–5.1)
RBC UR QL AUTO: NEGATIVE
RED CELL DISTRIBUTION WIDTH-SD: 45.7 FL (ref 35.1–46.3)
SODIUM SERPL-SCNC: 131 MMOL/L (ref 136–144)
SP GR UR STRIP.AUTO: <1.005 (ref 1–1.03)
TRIGL SERPL-MCNC: 71 MG/DL (ref 30–149)
UROBILINOGEN UR STRIP.AUTO-MCNC: <2 MG/DL
VLDLC SERPL CALC-MCNC: 14 MG/DL (ref 0–30)
WBC # BLD AUTO: 6.1 X10(3) UL (ref 4–13)

## 2018-08-28 PROCEDURE — 80061 LIPID PANEL: CPT

## 2018-08-28 PROCEDURE — 36415 COLL VENOUS BLD VENIPUNCTURE: CPT

## 2018-08-28 PROCEDURE — 81003 URINALYSIS AUTO W/O SCOPE: CPT

## 2018-08-28 PROCEDURE — 85025 COMPLETE CBC W/AUTO DIFF WBC: CPT

## 2018-08-28 PROCEDURE — 80053 COMPREHEN METABOLIC PANEL: CPT

## 2018-08-31 ENCOUNTER — TELEPHONE (OUTPATIENT)
Dept: INTERNAL MEDICINE CLINIC | Facility: CLINIC | Age: 68
End: 2018-08-31

## 2018-08-31 NOTE — TELEPHONE ENCOUNTER
Patient says that since she has started taking atorvastatin she has been experiencing constipation and bloating - can we recommend a different medication? Ok to leave a detailed VM if she does not answer.

## 2018-08-31 NOTE — TELEPHONE ENCOUNTER
The pt called to report having severe constipation and gas after taking atorvastatin 10mg. The pt states has started atorvastatin in 9/2017. The pt has been taking gas-X and a stool softener OTC.      Per Dr. Ifeoma Anna, the pt is to hold the atorvastatin for 2

## 2018-09-10 ENCOUNTER — OFFICE VISIT (OUTPATIENT)
Dept: INTERNAL MEDICINE CLINIC | Facility: CLINIC | Age: 68
End: 2018-09-10

## 2018-09-10 VITALS
DIASTOLIC BLOOD PRESSURE: 82 MMHG | HEART RATE: 105 BPM | RESPIRATION RATE: 18 BRPM | SYSTOLIC BLOOD PRESSURE: 132 MMHG | BODY MASS INDEX: 17.01 KG/M2 | TEMPERATURE: 99 F | WEIGHT: 96 LBS | HEIGHT: 63 IN

## 2018-09-10 DIAGNOSIS — Z28.21 INFLUENZA VACCINATION DECLINED BY PATIENT: ICD-10-CM

## 2018-09-10 DIAGNOSIS — F17.200 TOBACCO DEPENDENCE: ICD-10-CM

## 2018-09-10 DIAGNOSIS — I10 ESSENTIAL HYPERTENSION: Primary | ICD-10-CM

## 2018-09-10 PROBLEM — I70.0 ABDOMINAL AORTIC ATHEROSCLEROSIS: Status: ACTIVE | Noted: 2018-09-10

## 2018-09-10 PROBLEM — I70.0 ABDOMINAL AORTIC ATHEROSCLEROSIS (HCC): Status: ACTIVE | Noted: 2018-09-10

## 2018-09-10 PROCEDURE — 99213 OFFICE O/P EST LOW 20 MIN: CPT | Performed by: INTERNAL MEDICINE

## 2018-09-10 NOTE — PROGRESS NOTES
HPI:    Patient ID: Marcy Downey is a 78year old female. Hypertension       Marcy Downey is a 78year old female. HPI:   Patient presents for recheck of her hypertension.  Pt has been taking medications as instructed, no medication hammad by mouth every 4 (four) hours as needed for congestion.  Disp:  Rfl:       Past Medical History:   Diagnosis Date   • Allergic rhinitis    • Colon cancer [facility]) 2013    surgery      Past Surgical History:  1/4/17: COLONOSCOPY  2002: HYSTERECTOMY      Comment statin (started due to cad risk and atherscelrosis of AA). howevere HDL >100. plan recheck US AAA for eval in 1 year and if progressin then trial of crestor 5 mg. pt declines flu vaccine.  The patient indicates understanding of these issues and agrees to th

## 2018-10-30 DIAGNOSIS — I10 ESSENTIAL HYPERTENSION: ICD-10-CM

## 2018-10-30 DIAGNOSIS — M81.0 AGE-RELATED OSTEOPOROSIS WITHOUT CURRENT PATHOLOGICAL FRACTURE: ICD-10-CM

## 2018-10-30 RX ORDER — LOSARTAN POTASSIUM 50 MG/1
TABLET ORAL
Qty: 90 TABLET | Refills: 1 | Status: SHIPPED | OUTPATIENT
Start: 2018-10-30 | End: 2019-03-18

## 2018-10-30 RX ORDER — ALENDRONATE SODIUM 70 MG/1
TABLET ORAL
Qty: 12 TABLET | Refills: 3 | Status: SHIPPED | OUTPATIENT
Start: 2018-10-30 | End: 2019-08-03

## 2019-03-04 ENCOUNTER — TELEPHONE (OUTPATIENT)
Dept: INTERNAL MEDICINE CLINIC | Facility: CLINIC | Age: 69
End: 2019-03-04

## 2019-03-04 DIAGNOSIS — M81.0 AGE-RELATED OSTEOPOROSIS WITHOUT CURRENT PATHOLOGICAL FRACTURE: ICD-10-CM

## 2019-03-04 DIAGNOSIS — I10 ESSENTIAL HYPERTENSION: Primary | ICD-10-CM

## 2019-03-09 LAB
ALBUMIN/GLOBULIN RATIO: 2 (CALC) (ref 1–2.5)
ALBUMIN: 4.6 G/DL (ref 3.6–5.1)
ALKALINE PHOSPHATASE: 75 U/L (ref 33–130)
ALT: 17 U/L (ref 6–29)
APPEARANCE: CLEAR
AST: 25 U/L (ref 10–35)
BILIRUBIN, TOTAL: 0.5 MG/DL (ref 0.2–1.2)
BILIRUBIN: NEGATIVE
BUN/CREATININE RATIO: 13 (CALC) (ref 6–22)
BUN: 6 MG/DL (ref 7–25)
CALCIUM: 9.3 MG/DL (ref 8.6–10.4)
CARBON DIOXIDE: 27 MMOL/L (ref 20–32)
CHLORIDE: 99 MMOL/L (ref 98–110)
CHOL/HDLC RATIO: 1.7 (CALC)
CHOLESTEROL, TOTAL: 243 MG/DL
COLOR: YELLOW
CREATININE: 0.46 MG/DL (ref 0.5–0.99)
EGFR IF AFRICN AM: 119 ML/MIN/1.73M2
EGFR IF NONAFRICN AM: 103 ML/MIN/1.73M2
GLOBULIN: 2.3 G/DL (CALC) (ref 1.9–3.7)
GLUCOSE: 90 MG/DL (ref 65–99)
GLUCOSE: NEGATIVE
HDL CHOLESTEROL: 142 MG/DL
KETONES: NEGATIVE
LDL-CHOLESTEROL: 82 MG/DL (CALC)
LEUKOCYTE ESTERASE: NEGATIVE
NITRITE: NEGATIVE
NON-HDL CHOLESTEROL: 101 MG/DL (CALC)
OCCULT BLOOD: NEGATIVE
PH: 7 (ref 5–8)
POTASSIUM: 4.4 MMOL/L (ref 3.5–5.3)
PROTEIN, TOTAL: 6.9 G/DL (ref 6.1–8.1)
PROTEIN: NEGATIVE
SODIUM: 135 MMOL/L (ref 135–146)
SPECIFIC GRAVITY: 1.01 (ref 1–1.03)
TRIGLYCERIDES: 98 MG/DL
VITAMIN D, 25-OH, TOTAL: 8 NG/ML (ref 30–100)

## 2019-03-11 ENCOUNTER — OFFICE VISIT (OUTPATIENT)
Dept: INTERNAL MEDICINE CLINIC | Facility: CLINIC | Age: 69
End: 2019-03-11
Payer: MEDICARE

## 2019-03-11 VITALS
WEIGHT: 102 LBS | RESPIRATION RATE: 16 BRPM | BODY MASS INDEX: 18.07 KG/M2 | HEIGHT: 63 IN | HEART RATE: 110 BPM | TEMPERATURE: 98 F | DIASTOLIC BLOOD PRESSURE: 88 MMHG | SYSTOLIC BLOOD PRESSURE: 154 MMHG

## 2019-03-11 DIAGNOSIS — E55.9 VITAMIN D DEFICIENCY: ICD-10-CM

## 2019-03-11 DIAGNOSIS — Z00.00 ANNUAL PHYSICAL EXAM: Primary | ICD-10-CM

## 2019-03-11 DIAGNOSIS — M81.0 AGE-RELATED OSTEOPOROSIS WITHOUT CURRENT PATHOLOGICAL FRACTURE: ICD-10-CM

## 2019-03-11 DIAGNOSIS — I10 ESSENTIAL HYPERTENSION: ICD-10-CM

## 2019-03-11 DIAGNOSIS — Z91.89 10 YEAR RISK OF MI OR STROKE 7.5% OR GREATER: ICD-10-CM

## 2019-03-11 DIAGNOSIS — Z28.21 INFLUENZA VACCINATION DECLINED BY PATIENT: ICD-10-CM

## 2019-03-11 DIAGNOSIS — Z85.038 HISTORY OF COLON CANCER, STAGE I: ICD-10-CM

## 2019-03-11 DIAGNOSIS — Z28.21 IMMUNIZATION NOT CARRIED OUT BECAUSE OF PATIENT REFUSAL: ICD-10-CM

## 2019-03-11 DIAGNOSIS — E22.2 SIADH (SYNDROME OF INAPPROPRIATE ADH PRODUCTION) (HCC): ICD-10-CM

## 2019-03-11 DIAGNOSIS — J41.0 SMOKERS' COUGH (HCC): Chronic | ICD-10-CM

## 2019-03-11 DIAGNOSIS — J30.1 NON-SEASONAL ALLERGIC RHINITIS DUE TO POLLEN: ICD-10-CM

## 2019-03-11 DIAGNOSIS — I70.0 ABDOMINAL AORTIC ATHEROSCLEROSIS (HCC): ICD-10-CM

## 2019-03-11 DIAGNOSIS — Z00.00 ENCOUNTER FOR ANNUAL HEALTH EXAMINATION: ICD-10-CM

## 2019-03-11 DIAGNOSIS — F17.200 TOBACCO DEPENDENCE: ICD-10-CM

## 2019-03-11 PROCEDURE — 99397 PER PM REEVAL EST PAT 65+ YR: CPT | Performed by: INTERNAL MEDICINE

## 2019-03-11 PROCEDURE — G0439 PPPS, SUBSEQ VISIT: HCPCS | Performed by: INTERNAL MEDICINE

## 2019-03-11 PROCEDURE — 96160 PT-FOCUSED HLTH RISK ASSMT: CPT | Performed by: INTERNAL MEDICINE

## 2019-03-11 RX ORDER — AMLODIPINE BESYLATE 5 MG/1
5 TABLET ORAL DAILY
Qty: 30 TABLET | Refills: 0 | Status: SHIPPED | OUTPATIENT
Start: 2019-03-11 | End: 2019-03-26

## 2019-03-11 RX ORDER — ERGOCALCIFEROL 1.25 MG/1
50000 CAPSULE ORAL WEEKLY
Qty: 12 CAPSULE | Refills: 0 | Status: SHIPPED | OUTPATIENT
Start: 2019-03-11 | End: 2019-04-10

## 2019-03-11 NOTE — PATIENT INSTRUCTIONS
Bhupinder Zhang's SCREENING SCHEDULE   Tests on this list are recommended by your physician but may not be covered, or covered at this frequency, by your insurer. Please check with your insurance carrier before scheduling to verify coverage.    PREVEN every 10 years- more often if abnormal Colonoscopy due on 01/04/2020 Update [facility] if applicable    Flex Sigmoidoscopy Screen  Covered every 5 years No results found for this or any previous visit. No flowsheet data found.      Fecal Occult Bloo (Pneumovax)  Covered Once after 65 Orders placed or performed in visit on 01/19/17   • PNEUMOCOCCAL IMM (PNEUMOVAX)    Please get once after your 65th birthday    Hepatitis B for Moderate/High Risk       No orders found for this or any previous visit.  Medi

## 2019-03-11 NOTE — PROGRESS NOTES
HPI:   Wander Ibanez is a 80year old female who presents for a MA (Medicare Advantage) [address] [facility] (Once per calendar year). HPI:  Here for  HRA.   Normal state of health  Denies cp or sob  Reports adherence to meds    PAST MEDICAL, SOCIAL, FAMILY s/p resection     Non-seasonal allergic rhinitis due to pollen     Essential hypertension     Age-related osteoporosis without current pathological fracture     10 year risk of MI or stroke 7.5% or greater     Tobacco dependence     SIADH (syndrome of inap and Colon cancer ([facility] [address] ().     She  has a past surgical history that includes colonoscopy (17); ; tonsillectomy (was a child in grade school); other surgical history (cysts removed from breast, colon cancer surgery); hysterectomy (); oophore Pass            Visual Acuity  Right Eye Visual Acuity: Corrected Right Eye Chart Acuity: 20/20   Left Eye Visual Acuity: Corrected Left Eye Chart Acuity: 20/20   Both Eyes Visual Acuity: Corrected Both Eyes Chart Acuity: 20/20   Able To Tolerate Visual Ac visit:    Annual physical exam    10 year risk of MI or stroke 7.5% or greater    Abdominal aortic atherosclerosis (HCC)    SIADH (syndrome of inappropriate ADH production) ([facility] [address]    Age-related osteoporosis without current pathological fracture    [facility] (around 3/25/2019) for htn follow up.      Caroline Lowery MD, 3/11/2019     General Health     In the past six months, have you lost more than 10 pounds without trying?: 2 - No  Has your appetite been poor?: No  How does the patient maintain a good energy le Chlamydia  Annually if high risk No results found for: CHLAMYDIA No flowsheet data found.     Screening Mammogram      Mammogram Annually to 77, then as discussed Mammogram due on 08/16/2019 Update Health Maintenance if applicable     Immunizations (Update 80year old female. HPI    Review of Systems           Current Outpatient Medications:  amLODIPine Besylate (NORVASC) 5 MG Oral Tab Take 1 tablet (5 mg total) by mouth daily.  Disp: 30 tablet Rfl: 0   ergocalciferol 71702 units Oral Cap Take 1 capsule (5 Prescriptions Disp Refills   • amLODIPine Besylate (NORVASC) 5 MG Oral Tab 30 tablet 0     Sig: Take 1 tablet (5 mg total) by mouth daily.    • ergocalciferol 03265 units Oral Cap 12 capsule 0     Sig: Take 1 capsule (50,000 Units total) by mouth once a wee

## 2019-03-18 DIAGNOSIS — I10 ESSENTIAL HYPERTENSION: ICD-10-CM

## 2019-03-18 RX ORDER — LOSARTAN POTASSIUM 50 MG/1
TABLET ORAL
Qty: 90 TABLET | Refills: 1 | Status: SHIPPED | OUTPATIENT
Start: 2019-03-18 | End: 2019-08-06

## 2019-03-26 PROBLEM — R45.89 ANXIETY ABOUT HEALTH: Status: ACTIVE | Noted: 2019-03-26

## 2019-03-26 PROBLEM — F41.8 ANXIETY ABOUT HEALTH: Status: ACTIVE | Noted: 2019-03-26

## 2019-03-26 NOTE — PROGRESS NOTES
HPI:    Patient ID: Iliana Crandall is a 80year old female. HPI  Iliana Crandall is a 80year old female. HPI:   Patient presents for recheck of her hypertension.  Pt has been taking medications as instructed, no medication side effects, dread tablet by mouth daily. Disp:  Rfl:    CALCIUM LACTATE OR Take 1 tablet by mouth daily. Disp:  Rfl:    Fluticasone Propionate (FLONASE NA) by Nasal route as needed. Disp:  Rfl:    loratadine (CLARITIN) 10 MG Oral Tab Take 10 mg by mouth daily as needed. tenderness  EXTREMITIES: no cyanosis, clubbing or edema    ASSESSMENT AND PLAN:   Pt presents for a recheck of her hypertension. BP is well controlled, no significant medication side effects noted, needs to quit smoking.  Pt has anxiety PLAN: will continue Prescriptions Disp Refills   • amLODIPine Besylate (NORVASC) 5 MG Oral Tab 90 tablet 1     Sig: Take 1 tablet (5 mg total) by mouth daily.    • ALPRAZolam (XANAX) 0.25 MG Oral Tab 60 tablet 1     Sig: Take 1 tablet (0.25 mg total) by mouth 2 (two) times linda

## 2019-08-03 DIAGNOSIS — M81.0 AGE-RELATED OSTEOPOROSIS WITHOUT CURRENT PATHOLOGICAL FRACTURE: ICD-10-CM

## 2019-08-03 RX ORDER — ALENDRONATE SODIUM 70 MG/1
TABLET ORAL
Qty: 12 TABLET | Refills: 3 | Status: SHIPPED | OUTPATIENT
Start: 2019-08-03 | End: 2020-06-05

## 2019-08-06 DIAGNOSIS — I10 ESSENTIAL HYPERTENSION: ICD-10-CM

## 2019-08-07 RX ORDER — AMLODIPINE BESYLATE 5 MG/1
5 TABLET ORAL DAILY
Qty: 90 TABLET | Refills: 1 | Status: SHIPPED | OUTPATIENT
Start: 2019-08-07 | End: 2019-12-28

## 2019-08-07 RX ORDER — LOSARTAN POTASSIUM 50 MG/1
TABLET ORAL
Qty: 90 TABLET | Refills: 1 | Status: SHIPPED | OUTPATIENT
Start: 2019-08-07 | End: 2019-12-28

## 2019-09-09 ENCOUNTER — TELEPHONE (OUTPATIENT)
Dept: INTERNAL MEDICINE CLINIC | Facility: CLINIC | Age: 69
End: 2019-09-09

## 2019-09-09 DIAGNOSIS — E55.9 VITAMIN D DEFICIENCY: ICD-10-CM

## 2019-09-09 DIAGNOSIS — Z91.89 10 YEAR RISK OF MI OR STROKE 7.5% OR GREATER: Primary | ICD-10-CM

## 2019-09-09 DIAGNOSIS — F17.200 TOBACCO DEPENDENCE: ICD-10-CM

## 2019-09-09 DIAGNOSIS — I10 ESSENTIAL HYPERTENSION: ICD-10-CM

## 2019-09-09 DIAGNOSIS — M81.0 AGE-RELATED OSTEOPOROSIS WITHOUT CURRENT PATHOLOGICAL FRACTURE: ICD-10-CM

## 2019-09-10 ENCOUNTER — TELEPHONE (OUTPATIENT)
Dept: INTERNAL MEDICINE CLINIC | Facility: CLINIC | Age: 69
End: 2019-09-10

## 2019-09-10 DIAGNOSIS — Z91.89 10 YEAR RISK OF MI OR STROKE 7.5% OR GREATER: ICD-10-CM

## 2019-09-10 DIAGNOSIS — M81.0 AGE-RELATED OSTEOPOROSIS WITHOUT CURRENT PATHOLOGICAL FRACTURE: ICD-10-CM

## 2019-09-10 DIAGNOSIS — I10 ESSENTIAL HYPERTENSION: ICD-10-CM

## 2019-09-10 DIAGNOSIS — E55.9 VITAMIN D DEFICIENCY: ICD-10-CM

## 2019-09-10 DIAGNOSIS — Z12.39 SCREENING FOR BREAST CANCER: Primary | ICD-10-CM

## 2019-09-10 NOTE — TELEPHONE ENCOUNTER
Sunny Curry, a [employer] Medicare agent, called and stated patient was told to complete her mammogram at [facility] however, per Eduardo patient can complete a preventative mammogram at [address], and it will be covered.  Meanwhile, it was advised patient g

## 2019-09-10 NOTE — TELEPHONE ENCOUNTER
Marium called and stated patient prefers [facility] for her labs. Please change all labs to [facility], and she is aware that the cost will be higher. Marium explained everything to the patient.

## 2019-09-12 ENCOUNTER — APPOINTMENT (OUTPATIENT)
Dept: LAB | Age: 69
End: 2019-09-12
Attending: INTERNAL MEDICINE
Payer: MEDICARE

## 2019-09-12 ENCOUNTER — HOSPITAL ENCOUNTER (OUTPATIENT)
Dept: MAMMOGRAPHY | Age: 69
Discharge: HOME | End: 2019-09-12
Attending: INTERNAL MEDICINE
Payer: MEDICARE

## 2019-09-12 DIAGNOSIS — Z12.39 SCREENING FOR BREAST CANCER: ICD-10-CM

## 2019-09-12 LAB
ALBUMIN SERPL-MCNC: 4.2 G/DL (ref 3.4–5)
ALBUMIN/GLOB SERPL: 1.2 {RATIO} (ref 1–2)
ALP LIVER SERPL-CCNC: 90 U/L (ref 55–142)
ALT SERPL-CCNC: 26 U/L (ref 13–56)
ANION GAP SERPL CALC-SCNC: 10 MMOL/L (ref 0–18)
AST SERPL-CCNC: 27 U/L (ref 15–37)
BASOPHILS # BLD AUTO: 0.03 X10(3) UL (ref 0–0.2)
BASOPHILS NFR BLD AUTO: 0.5 %
BILIRUB SERPL-MCNC: 0.4 MG/DL (ref 0.1–2)
BILIRUB UR QL STRIP.AUTO: NEGATIVE
BUN BLD-MCNC: 5 MG/DL (ref 7–18)
BUN/CREAT SERPL: 9.4 (ref 10–20)
CALCIUM BLD-MCNC: 8.7 MG/DL (ref 8.5–10.1)
CHLORIDE SERPL-SCNC: 101 MMOL/L (ref 98–112)
CHOLEST SMN-MCNC: 278 MG/DL (ref ?–200)
CLARITY UR REFRACT.AUTO: CLEAR
CO2 SERPL-SCNC: 25 MMOL/L (ref 21–32)
COLOR UR AUTO: YELLOW
CREAT BLD-MCNC: 0.53 MG/DL (ref 0.55–1.02)
DEPRECATED RDW RBC AUTO: 42.8 FL (ref 35.1–46.3)
EOSINOPHIL # BLD AUTO: 0.05 X10(3) UL (ref 0–0.7)
EOSINOPHIL NFR BLD AUTO: 0.9 %
ERYTHROCYTE [DISTWIDTH] IN BLOOD BY AUTOMATED COUNT: 12.5 % (ref 11–15)
GLOBULIN PLAS-MCNC: 3.4 G/DL (ref 2.8–4.4)
GLUCOSE BLD-MCNC: 99 MG/DL (ref 70–99)
GLUCOSE UR STRIP.AUTO-MCNC: NEGATIVE MG/DL
HCT VFR BLD AUTO: 43.9 % (ref 35–48)
HDLC SERPL-MCNC: 175 MG/DL (ref 40–59)
HGB BLD-MCNC: 15.1 G/DL (ref 12–16)
IMM GRANULOCYTES # BLD AUTO: 0.02 X10(3) UL (ref 0–1)
IMM GRANULOCYTES NFR BLD: 0.3 %
LDLC SERPL CALC-MCNC: 82 MG/DL (ref ?–100)
LEUKOCYTE ESTERASE UR QL STRIP.AUTO: NEGATIVE
LYMPHOCYTES # BLD AUTO: 0.78 X10(3) UL (ref 1–4)
LYMPHOCYTES NFR BLD AUTO: 13.4 %
M PROTEIN MFR SERPL ELPH: 7.6 G/DL (ref 6.4–8.2)
MCH RBC QN AUTO: 31.9 PG (ref 26–34)
MCHC RBC AUTO-ENTMCNC: 34.4 G/DL (ref 31–37)
MCV RBC AUTO: 92.8 FL (ref 80–100)
MONOCYTES # BLD AUTO: 0.51 X10(3) UL (ref 0.1–1)
MONOCYTES NFR BLD AUTO: 8.7 %
NEUTROPHILS # BLD AUTO: 4.44 X10 (3) UL (ref 1.5–7.7)
NEUTROPHILS # BLD AUTO: 4.44 X10(3) UL (ref 1.5–7.7)
NEUTROPHILS NFR BLD AUTO: 76.2 %
NITRITE UR QL STRIP.AUTO: NEGATIVE
NONHDLC SERPL-MCNC: 103 MG/DL (ref ?–130)
OSMOLALITY SERPL CALC.SUM OF ELEC: 279 MOSM/KG (ref 275–295)
PH UR STRIP.AUTO: 6 [PH] (ref 4.5–8)
PLATELET # BLD AUTO: 282 10(3)UL (ref 150–450)
POTASSIUM SERPL-SCNC: 3.7 MMOL/L (ref 3.5–5.1)
PROT UR STRIP.AUTO-MCNC: NEGATIVE MG/DL
RBC # BLD AUTO: 4.73 X10(6)UL (ref 3.8–5.3)
RBC UR QL AUTO: NEGATIVE
SODIUM SERPL-SCNC: 136 MMOL/L (ref 136–145)
SP GR UR STRIP.AUTO: 1.01 (ref 1–1.03)
TRIGL SERPL-MCNC: 104 MG/DL (ref 30–149)
UROBILINOGEN UR STRIP.AUTO-MCNC: <2 MG/DL
VIT D+METAB SERPL-MCNC: 16.7 NG/ML (ref 30–100)
VLDLC SERPL CALC-MCNC: 21 MG/DL (ref 0–30)
WBC # BLD AUTO: 5.8 X10(3) UL (ref 4–11)

## 2019-09-12 PROCEDURE — 36415 COLL VENOUS BLD VENIPUNCTURE: CPT | Performed by: INTERNAL MEDICINE

## 2019-09-12 PROCEDURE — 80061 LIPID PANEL: CPT | Performed by: INTERNAL MEDICINE

## 2019-09-12 PROCEDURE — 77067 SCR MAMMO BI INCL CAD: CPT | Performed by: INTERNAL MEDICINE

## 2019-09-12 PROCEDURE — 82306 VITAMIN D 25 HYDROXY: CPT | Performed by: INTERNAL MEDICINE

## 2019-09-12 PROCEDURE — 85025 COMPLETE CBC W/AUTO DIFF WBC: CPT | Performed by: INTERNAL MEDICINE

## 2019-09-12 PROCEDURE — 80053 COMPREHEN METABOLIC PANEL: CPT | Performed by: INTERNAL MEDICINE

## 2019-09-12 PROCEDURE — 77063 BREAST TOMOSYNTHESIS BI: CPT | Performed by: INTERNAL MEDICINE

## 2019-09-12 PROCEDURE — 81003 URINALYSIS AUTO W/O SCOPE: CPT | Performed by: INTERNAL MEDICINE

## 2019-09-27 ENCOUNTER — OFFICE VISIT (OUTPATIENT)
Dept: INTERNAL MEDICINE CLINIC | Facility: CLINIC | Age: 69
End: 2019-09-27
Payer: MEDICARE

## 2019-09-27 ENCOUNTER — TELEPHONE (OUTPATIENT)
Dept: INTERNAL MEDICINE CLINIC | Facility: CLINIC | Age: 69
End: 2019-09-27

## 2019-09-27 VITALS
TEMPERATURE: 98 F | BODY MASS INDEX: 18.43 KG/M2 | SYSTOLIC BLOOD PRESSURE: 132 MMHG | DIASTOLIC BLOOD PRESSURE: 66 MMHG | WEIGHT: 104 LBS | HEART RATE: 106 BPM | RESPIRATION RATE: 16 BRPM | HEIGHT: 63 IN

## 2019-09-27 DIAGNOSIS — Z28.21 INFLUENZA VACCINATION DECLINED BY PATIENT: ICD-10-CM

## 2019-09-27 DIAGNOSIS — I10 ESSENTIAL HYPERTENSION: ICD-10-CM

## 2019-09-27 PROCEDURE — 99213 OFFICE O/P EST LOW 20 MIN: CPT | Performed by: INTERNAL MEDICINE

## 2019-09-27 NOTE — TELEPHONE ENCOUNTER
Reviewed in detail with the pt the CT calcium scoring was ordered by Dr. Bharath Carlin. MO is the imaging system and Dr. Brant Arana provides a cardiac interpretation and prevention advise. The pt verbalized understanding.

## 2019-09-27 NOTE — PROGRESS NOTES
HPI:    Patient ID: Josep Edwards is a 80year old female. HPI  Josep Edwards is a 80year old female. HPI:   Patient presents for recheck of her hypertension.  Pt has been taking medications as instructed, no medication side effects, dread Rfl:    loratadine (CLARITIN) 10 MG Oral Tab Take 10 mg by mouth daily as needed.    Disp:  Rfl:       Past Medical History:   Diagnosis Date   • Allergic rhinitis    • Cancer [facility])    • Colon cancer ([facility] 2013    surgery      Past Surgical History:   Proce side effects noted, needs to quit smoking. PLAN: will continue present medications, very strongly urged patient to quit smoking, check ct calcium store for elp with cad risk stratification.  The patient indicates understanding of these issues and agrees to

## 2019-09-27 NOTE — TELEPHONE ENCOUNTER
Patient has questions on the CT calcium scoring order that was put in today - she said she has a note about Dr Brown but doesn't know why, and wants to know why Dr Charlotte Lennon name is on the order as well. If no answer she said ok to leave a detailed VM.

## 2019-10-30 NOTE — TELEPHONE ENCOUNTER
Pt will be due for fasting labs. Can complete 2 days prior to appt. Orders placed. LMOVM informing pt of above. Home Suture Removal Text: Patient was provided instructions on removing sutures and will remove their sutures at home.  If they have any questions or difficulties they will call the office.

## 2019-12-27 DIAGNOSIS — I10 ESSENTIAL HYPERTENSION: ICD-10-CM

## 2019-12-28 RX ORDER — LOSARTAN POTASSIUM 50 MG/1
TABLET ORAL
Qty: 90 TABLET | Refills: 1 | Status: SHIPPED | OUTPATIENT
Start: 2019-12-28 | End: 2020-05-04

## 2019-12-28 RX ORDER — AMLODIPINE BESYLATE 5 MG/1
TABLET ORAL
Qty: 90 TABLET | Refills: 1 | Status: SHIPPED | OUTPATIENT
Start: 2019-12-28 | End: 2020-05-04

## 2020-03-06 ENCOUNTER — TELEPHONE (OUTPATIENT)
Dept: INTERNAL MEDICINE CLINIC | Facility: CLINIC | Age: 70
End: 2020-03-06

## 2020-03-06 DIAGNOSIS — Z00.00 ANNUAL PHYSICAL EXAM: Primary | ICD-10-CM

## 2020-03-09 ENCOUNTER — APPOINTMENT (OUTPATIENT)
Dept: LAB | Age: 70
End: 2020-03-09
Attending: INTERNAL MEDICINE
Payer: MEDICARE

## 2020-03-09 LAB
ALBUMIN SERPL-MCNC: 4 G/DL (ref 3.4–5)
ALBUMIN/GLOB SERPL: 1.1 {RATIO} (ref 1–2)
ALP LIVER SERPL-CCNC: 83 U/L (ref 55–142)
ALT SERPL-CCNC: 26 U/L (ref 13–56)
ANION GAP SERPL CALC-SCNC: 8 MMOL/L (ref 0–18)
AST SERPL-CCNC: 30 U/L (ref 15–37)
BASOPHILS # BLD AUTO: 0.02 X10(3) UL (ref 0–0.2)
BASOPHILS NFR BLD AUTO: 0.3 %
BILIRUB SERPL-MCNC: 0.5 MG/DL (ref 0.1–2)
BILIRUB UR QL STRIP.AUTO: NEGATIVE
BUN BLD-MCNC: 6 MG/DL (ref 7–18)
BUN/CREAT SERPL: 11.1 (ref 10–20)
CALCIUM BLD-MCNC: 8.8 MG/DL (ref 8.5–10.1)
CHLORIDE SERPL-SCNC: 96 MMOL/L (ref 98–112)
CHOLEST SMN-MCNC: 262 MG/DL (ref ?–200)
CLARITY UR REFRACT.AUTO: CLEAR
CO2 SERPL-SCNC: 25 MMOL/L (ref 21–32)
COLOR UR AUTO: YELLOW
CREAT BLD-MCNC: 0.54 MG/DL (ref 0.55–1.02)
DEPRECATED RDW RBC AUTO: 43.1 FL (ref 35.1–46.3)
EOSINOPHIL # BLD AUTO: 0.03 X10(3) UL (ref 0–0.7)
EOSINOPHIL NFR BLD AUTO: 0.4 %
ERYTHROCYTE [DISTWIDTH] IN BLOOD BY AUTOMATED COUNT: 12.5 % (ref 11–15)
GLOBULIN PLAS-MCNC: 3.8 G/DL (ref 2.8–4.4)
GLUCOSE BLD-MCNC: 90 MG/DL (ref 70–99)
GLUCOSE UR STRIP.AUTO-MCNC: NEGATIVE MG/DL
HCT VFR BLD AUTO: 42.7 % (ref 35–48)
HDLC SERPL-MCNC: 172 MG/DL (ref 40–59)
HGB BLD-MCNC: 14.7 G/DL (ref 12–16)
IMM GRANULOCYTES # BLD AUTO: 0.02 X10(3) UL (ref 0–1)
IMM GRANULOCYTES NFR BLD: 0.3 %
LDLC SERPL CALC-MCNC: 76 MG/DL (ref ?–100)
LEUKOCYTE ESTERASE UR QL STRIP.AUTO: NEGATIVE
LYMPHOCYTES # BLD AUTO: 0.75 X10(3) UL (ref 1–4)
LYMPHOCYTES NFR BLD AUTO: 11.1 %
M PROTEIN MFR SERPL ELPH: 7.8 G/DL (ref 6.4–8.2)
MCH RBC QN AUTO: 32.4 PG (ref 26–34)
MCHC RBC AUTO-ENTMCNC: 34.4 G/DL (ref 31–37)
MCV RBC AUTO: 94.1 FL (ref 80–100)
MONOCYTES # BLD AUTO: 0.68 X10(3) UL (ref 0.1–1)
MONOCYTES NFR BLD AUTO: 10.1 %
NEUTROPHILS # BLD AUTO: 5.26 X10 (3) UL (ref 1.5–7.7)
NEUTROPHILS # BLD AUTO: 5.26 X10(3) UL (ref 1.5–7.7)
NEUTROPHILS NFR BLD AUTO: 77.8 %
NITRITE UR QL STRIP.AUTO: NEGATIVE
NONHDLC SERPL-MCNC: 90 MG/DL (ref ?–130)
OSMOLALITY SERPL CALC.SUM OF ELEC: 265 MOSM/KG (ref 275–295)
PATIENT FASTING Y/N/NP: YES
PATIENT FASTING Y/N/NP: YES
PH UR STRIP.AUTO: 7 [PH] (ref 4.5–8)
PLATELET # BLD AUTO: 277 10(3)UL (ref 150–450)
POTASSIUM SERPL-SCNC: 3.6 MMOL/L (ref 3.5–5.1)
PROT UR STRIP.AUTO-MCNC: NEGATIVE MG/DL
RBC # BLD AUTO: 4.54 X10(6)UL (ref 3.8–5.3)
RBC UR QL AUTO: NEGATIVE
SODIUM SERPL-SCNC: 129 MMOL/L (ref 136–145)
SP GR UR STRIP.AUTO: 1.01 (ref 1–1.03)
TRIGL SERPL-MCNC: 70 MG/DL (ref 30–149)
TSI SER-ACNC: 1.34 MIU/ML (ref 0.36–3.74)
UROBILINOGEN UR STRIP.AUTO-MCNC: <2 MG/DL
VIT D+METAB SERPL-MCNC: 35.6 NG/ML (ref 30–100)
VLDLC SERPL CALC-MCNC: 14 MG/DL (ref 0–30)
WBC # BLD AUTO: 6.8 X10(3) UL (ref 4–11)

## 2020-03-09 PROCEDURE — 84443 ASSAY THYROID STIM HORMONE: CPT | Performed by: INTERNAL MEDICINE

## 2020-03-09 PROCEDURE — 80061 LIPID PANEL: CPT | Performed by: INTERNAL MEDICINE

## 2020-03-09 PROCEDURE — 80053 COMPREHEN METABOLIC PANEL: CPT | Performed by: INTERNAL MEDICINE

## 2020-03-09 PROCEDURE — 82306 VITAMIN D 25 HYDROXY: CPT | Performed by: INTERNAL MEDICINE

## 2020-03-09 PROCEDURE — 85025 COMPLETE CBC W/AUTO DIFF WBC: CPT | Performed by: INTERNAL MEDICINE

## 2020-03-09 PROCEDURE — 36415 COLL VENOUS BLD VENIPUNCTURE: CPT | Performed by: INTERNAL MEDICINE

## 2020-03-09 PROCEDURE — 81003 URINALYSIS AUTO W/O SCOPE: CPT | Performed by: INTERNAL MEDICINE

## 2020-05-02 DIAGNOSIS — I10 ESSENTIAL HYPERTENSION: ICD-10-CM

## 2020-05-04 RX ORDER — AMLODIPINE BESYLATE 5 MG/1
TABLET ORAL
Qty: 90 TABLET | Refills: 1 | Status: SHIPPED | OUTPATIENT
Start: 2020-05-04 | End: 2020-06-05

## 2020-05-04 RX ORDER — LOSARTAN POTASSIUM 50 MG/1
TABLET ORAL
Qty: 90 TABLET | Refills: 1 | Status: SHIPPED | OUTPATIENT
Start: 2020-05-04 | End: 2020-06-05

## 2020-05-26 ENCOUNTER — VIRTUAL PHONE E/M (OUTPATIENT)
Dept: INTERNAL MEDICINE CLINIC | Facility: CLINIC | Age: 70
End: 2020-05-26
Payer: MEDICARE

## 2020-05-26 DIAGNOSIS — J01.00 ACUTE NON-RECURRENT MAXILLARY SINUSITIS: Primary | ICD-10-CM

## 2020-05-26 PROCEDURE — 99442 PHONE E/M BY PHYS 11-20 MIN: CPT | Performed by: INTERNAL MEDICINE

## 2020-05-26 RX ORDER — DOXYCYCLINE HYCLATE 100 MG
100 TABLET ORAL 2 TIMES DAILY
Qty: 20 TABLET | Refills: 0 | Status: SHIPPED | OUTPATIENT
Start: 2020-05-26 | End: 2020-06-05 | Stop reason: ALTCHOICE

## 2020-05-26 RX ORDER — PREDNISONE 20 MG/1
TABLET ORAL
Qty: 14 TABLET | Refills: 0 | Status: SHIPPED | OUTPATIENT
Start: 2020-05-26 | End: 2020-06-05 | Stop reason: ALTCHOICE

## 2020-05-26 NOTE — PROGRESS NOTES
HPI:    Patient ID: Wander Ibanez is a 77year old female. Sinus Problem   This is a new problem. The current episode started more than 1 month ago. The problem has been rapidly worsening since onset. There has been no fever.  Her pain is at a heather upset  Dust                    OTHER (SEE COMMENTS)    Comment:sneezing  Pcn [Penicillins]       HIVES  Mold                    Coughing    Comment:sneezing   PHYSICAL EXAM:   Physical Exam   Constitutional: No distress.    Pulmonary/Chest: No respiratory d

## 2020-05-26 NOTE — PATIENT INSTRUCTIONS
Sinusitis (Antibiotic Treatment)    The sinuses are air-filled spaces within the bones of the face. They connect to the inside of the nose. Sinusitis is an inflammation of the tissue that lines the sinuses. Sinusitis can occur during a cold.  It can also · You can use an OTC decongestant, unless a similar medicine was prescribed to you. Nasal sprays work the fastest. Use one that contains phenylephrine or oxymetazoline. First blow your nose gently. Then use the spray.  Don't use these medicines more often t Call 911 if any of these occur:   · Seizure  · Trouble breathing  · Feeling dizzy or faint  · Fingernails, skin or lips look blue, purple , or gray  Prevention  Here are steps you can take to help prevent an infection:   · Keep good hand washing habits.   ·

## 2020-05-26 NOTE — PROGRESS NOTES
Virtual Telephone Check-In    Marcy Downey verbally consents to a Virtual/Telephone Check-In visit on 05/26/20. Patient has been referred to the [facility] website at [email or web address] to review the yearly Consent to Treat document.     Patient und

## 2020-06-05 ENCOUNTER — OFFICE VISIT (OUTPATIENT)
Dept: INTERNAL MEDICINE CLINIC | Facility: CLINIC | Age: 70
End: 2020-06-05
Payer: MEDICARE

## 2020-06-05 VITALS
BODY MASS INDEX: 17.54 KG/M2 | HEIGHT: 63 IN | HEART RATE: 103 BPM | RESPIRATION RATE: 16 BRPM | SYSTOLIC BLOOD PRESSURE: 122 MMHG | OXYGEN SATURATION: 98 % | WEIGHT: 99 LBS | DIASTOLIC BLOOD PRESSURE: 70 MMHG | TEMPERATURE: 98 F

## 2020-06-05 DIAGNOSIS — E22.2 SIADH (SYNDROME OF INAPPROPRIATE ADH PRODUCTION) (HCC): ICD-10-CM

## 2020-06-05 DIAGNOSIS — Z28.21 INFLUENZA VACCINATION DECLINED BY PATIENT: ICD-10-CM

## 2020-06-05 DIAGNOSIS — J41.0 SMOKERS' COUGH (HCC): ICD-10-CM

## 2020-06-05 DIAGNOSIS — Z12.11 COLON CANCER SCREENING: ICD-10-CM

## 2020-06-05 DIAGNOSIS — J01.00 ACUTE NON-RECURRENT MAXILLARY SINUSITIS: ICD-10-CM

## 2020-06-05 DIAGNOSIS — Z85.038 HISTORY OF COLON CANCER, STAGE I: ICD-10-CM

## 2020-06-05 DIAGNOSIS — J30.1 NON-SEASONAL ALLERGIC RHINITIS DUE TO POLLEN: ICD-10-CM

## 2020-06-05 DIAGNOSIS — F41.8 ANXIETY ABOUT HEALTH: ICD-10-CM

## 2020-06-05 DIAGNOSIS — Z12.31 ENCOUNTER FOR SCREENING MAMMOGRAM FOR MALIGNANT NEOPLASM OF BREAST: ICD-10-CM

## 2020-06-05 DIAGNOSIS — F17.200 TOBACCO DEPENDENCE: ICD-10-CM

## 2020-06-05 DIAGNOSIS — I70.0 ABDOMINAL AORTIC ATHEROSCLEROSIS (HCC): ICD-10-CM

## 2020-06-05 DIAGNOSIS — Z00.00 ENCOUNTER FOR ANNUAL HEALTH EXAMINATION: ICD-10-CM

## 2020-06-05 DIAGNOSIS — I10 ESSENTIAL HYPERTENSION: ICD-10-CM

## 2020-06-05 DIAGNOSIS — M81.0 AGE-RELATED OSTEOPOROSIS WITHOUT CURRENT PATHOLOGICAL FRACTURE: ICD-10-CM

## 2020-06-05 DIAGNOSIS — Z00.00 ANNUAL PHYSICAL EXAM: Primary | ICD-10-CM

## 2020-06-05 DIAGNOSIS — Z91.89 10 YEAR RISK OF MI OR STROKE 7.5% OR GREATER: ICD-10-CM

## 2020-06-05 PROCEDURE — 96160 PT-FOCUSED HLTH RISK ASSMT: CPT | Performed by: INTERNAL MEDICINE

## 2020-06-05 PROCEDURE — 99397 PER PM REEVAL EST PAT 65+ YR: CPT | Performed by: INTERNAL MEDICINE

## 2020-06-05 PROCEDURE — G0439 PPPS, SUBSEQ VISIT: HCPCS | Performed by: INTERNAL MEDICINE

## 2020-06-05 RX ORDER — ALENDRONATE SODIUM 70 MG/1
TABLET ORAL
Qty: 12 TABLET | Refills: 3 | Status: SHIPPED | OUTPATIENT
Start: 2020-06-05 | End: 2020-06-11

## 2020-06-05 RX ORDER — LOSARTAN POTASSIUM 50 MG/1
50 TABLET ORAL DAILY
Qty: 90 TABLET | Refills: 1 | Status: SHIPPED | OUTPATIENT
Start: 2020-06-05 | End: 2020-10-08

## 2020-06-05 RX ORDER — AMLODIPINE BESYLATE 5 MG/1
5 TABLET ORAL DAILY
Qty: 90 TABLET | Refills: 1 | Status: SHIPPED | OUTPATIENT
Start: 2020-06-05 | End: 2020-10-08

## 2020-06-05 RX ORDER — DOXYCYCLINE HYCLATE 100 MG
100 TABLET ORAL 2 TIMES DAILY
Qty: 14 TABLET | Refills: 0 | Status: SHIPPED | OUTPATIENT
Start: 2020-06-05 | End: 2020-10-08

## 2020-06-05 NOTE — PROGRESS NOTES
HPI:   Marcy Downey is a 77year old female who presents for a MA (Medicare Advantage) [address] [facility] (Once per calendar year). HPI:  Here for HRA.   No new complaints or issues  Denies cp or sob  No issues with meds    PAST MEDICAL, SOCIAL, FAMILY HI so is at low risk.     Patient Care Team: Patient Care Team:  Joseph Burrell MD as PCP - General (Internal Medicine)    Patient Active Problem List:     History of colon cancer, stage I sigmoid, 2013 s/p resection     Non-seasonal allergic rhinitis due to p tonsillectomy (was a child in grade school); other surgical history (cysts removed from breast, colon cancer surgery); colonoscopy (N/A, 1/4/2017); hysterectomy (2002); oophorectomy (Bilateral, 2002); needle biopsy right (Right, 1978); and linda localization Right Eye Chart Acuity: 20/25   Left Eye Visual Acuity: Corrected Left Eye Chart Acuity: 20/25   Both Eyes Visual Acuity: Corrected Both Eyes Chart Acuity: 20/25   Able To Tolerate Visual Acuity: Yes      General Appearance:  Alert, cooperative, no distres visit:    Annual physical exam    Smokers' cough ([facility]    Abdominal aortic atherosclerosis (HCC)    SIADH (syndrome of inappropriate ADH production) ([facility]    10 year risk of MI or stroke 7.5% or greater  -     CT CALCIUM SCORING;  Future    Tobacco dependen continue control of cad risk factors     Smokers' cough ([facility]- stable. monitor     Anxiety about health-stable. Cont prn xanax for sxs control  The patient indicates understanding of these issues and agrees to the plan.   Reinforced healthy diet, lifestyle, Screening      Dexascan Every two years Last Dexa Scan:   XR DEXA BONE DENSITOMETRY (CPT=77080) 08/30/2017    No flowsheet data found.     Pap and Pelvic      Pap: Every 3 yrs age 21-65 or Pap+HPV every 5 yrs age 33-67, age 72 and older at high risk There a found.    Creatinine  Annually CREATININE (mg/dL)   Date Value   03/08/2019 0.46 (L)     Creatinine (mg/dL)   Date Value   03/09/2020 0.54 (L)    No flowsheet data found.     Drug Serum Conc  Annually No results found for: DIGOXIN, DIG, VALP No flowsheet da resection  Essential hypertension  Anxiety about health  Age-related osteoporosis without current pathological fracture  Encounter for annual health examination  Colon cancer screening  Encounter for screening mammogram for malignant neoplasm of breast  Ac

## 2020-06-05 NOTE — PATIENT INSTRUCTIONS
Julissa Zhang's SCREENING SCHEDULE   Tests on this list are recommended by your physician but may not be covered, or covered at this frequency, by your insurer. Please check with your insurance carrier before scheduling to verify coverage.    PREVEN following criteria:   • Men who are 73-68 years old and have smoked more than 100 cigarettes in their lifetime   • Anyone with a family history    Colorectal Cancer Screening  Covered up to Age 76     Colonoscopy Screen   Covered every 10 years- more often placed or performed in visit on 09/27/19   • FLU VACC HIGH DOSE PRSV FREE    Please get every year    Pneumococcal 13 (Prevnar)  Covered Once after 65 No orders found for this or any previous visit.  Please get once after your 65th birthday    Pneumococcal

## 2020-06-11 ENCOUNTER — TELEPHONE (OUTPATIENT)
Dept: INTERNAL MEDICINE CLINIC | Facility: CLINIC | Age: 70
End: 2020-06-11

## 2020-06-11 DIAGNOSIS — J34.89 NASAL DISCOMFORT: ICD-10-CM

## 2020-06-11 DIAGNOSIS — R07.0 THROAT DISCOMFORT: Primary | ICD-10-CM

## 2020-06-11 DIAGNOSIS — M81.0 AGE-RELATED OSTEOPOROSIS WITHOUT CURRENT PATHOLOGICAL FRACTURE: ICD-10-CM

## 2020-06-11 RX ORDER — ALENDRONATE SODIUM 70 MG/1
TABLET ORAL
Qty: 12 TABLET | Refills: 3 | Status: SHIPPED | OUTPATIENT
Start: 2020-06-11 | End: 2020-10-08

## 2020-06-11 NOTE — TELEPHONE ENCOUNTER
Irritation of nose and mouth    Reports: \"Burning of nose and mouth\"  Nose is red    Denies congestion  Denies pain and pressure on sides  Denies Headache  Denies Nasal drainage    Smoking makes the burning worse.     Requesting a different antibiotic \

## 2020-06-11 NOTE — TELEPHONE ENCOUNTER
Patient called and asked for a different antibiotic since the previous RX is not working for her. Please advise. This is in regards to her sinus infection.

## 2020-06-11 NOTE — TELEPHONE ENCOUNTER
Spoke with pt as this medication was sent to retail 6/5/2020. Pt would like rx sent to mail order. Rx sent.

## 2020-06-29 ENCOUNTER — MED REC SCAN ONLY (OUTPATIENT)
Dept: INTERNAL MEDICINE CLINIC | Facility: CLINIC | Age: 70
End: 2020-06-29

## 2020-07-06 ENCOUNTER — MED REC SCAN ONLY (OUTPATIENT)
Dept: INTERNAL MEDICINE CLINIC | Facility: CLINIC | Age: 70
End: 2020-07-06

## 2020-08-05 ENCOUNTER — MED REC SCAN ONLY (OUTPATIENT)
Dept: INTERNAL MEDICINE CLINIC | Facility: CLINIC | Age: 70
End: 2020-08-05

## 2020-10-06 DIAGNOSIS — I10 ESSENTIAL HYPERTENSION: ICD-10-CM

## 2020-10-07 RX ORDER — AMLODIPINE BESYLATE 5 MG/1
TABLET ORAL
Qty: 90 TABLET | Refills: 1 | OUTPATIENT
Start: 2020-10-07

## 2020-10-07 RX ORDER — LOSARTAN POTASSIUM 50 MG/1
TABLET ORAL
Qty: 90 TABLET | Refills: 1 | OUTPATIENT
Start: 2020-10-07

## 2020-10-08 ENCOUNTER — OFFICE VISIT (OUTPATIENT)
Dept: INTERNAL MEDICINE CLINIC | Facility: CLINIC | Age: 70
End: 2020-10-08
Payer: MEDICARE

## 2020-10-08 VITALS
WEIGHT: 100 LBS | DIASTOLIC BLOOD PRESSURE: 62 MMHG | HEART RATE: 82 BPM | SYSTOLIC BLOOD PRESSURE: 138 MMHG | HEIGHT: 63 IN | OXYGEN SATURATION: 98 % | TEMPERATURE: 97 F | BODY MASS INDEX: 17.72 KG/M2 | RESPIRATION RATE: 16 BRPM

## 2020-10-08 DIAGNOSIS — F17.200 TOBACCO DEPENDENCE: ICD-10-CM

## 2020-10-08 DIAGNOSIS — I10 ESSENTIAL HYPERTENSION: Primary | ICD-10-CM

## 2020-10-08 DIAGNOSIS — Z23 NEED FOR INFLUENZA VACCINATION: ICD-10-CM

## 2020-10-08 DIAGNOSIS — K21.00 GASTROESOPHAGEAL REFLUX DISEASE WITH ESOPHAGITIS WITHOUT HEMORRHAGE: ICD-10-CM

## 2020-10-08 PROBLEM — Z78.9 HEPATITIS C ANTIBODY TEST NEGATIVE: Status: ACTIVE | Noted: 2020-10-08

## 2020-10-08 PROBLEM — Z28.21 INFLUENZA VACCINATION DECLINED BY PATIENT: Status: RESOLVED | Noted: 2018-03-06 | Resolved: 2020-10-08

## 2020-10-08 PROCEDURE — 90662 IIV NO PRSV INCREASED AG IM: CPT | Performed by: INTERNAL MEDICINE

## 2020-10-08 PROCEDURE — 3008F BODY MASS INDEX DOCD: CPT | Performed by: INTERNAL MEDICINE

## 2020-10-08 PROCEDURE — 3075F SYST BP GE 130 - 139MM HG: CPT | Performed by: INTERNAL MEDICINE

## 2020-10-08 PROCEDURE — 99214 OFFICE O/P EST MOD 30 MIN: CPT | Performed by: INTERNAL MEDICINE

## 2020-10-08 PROCEDURE — 3078F DIAST BP <80 MM HG: CPT | Performed by: INTERNAL MEDICINE

## 2020-10-08 PROCEDURE — G0008 ADMIN INFLUENZA VIRUS VAC: HCPCS | Performed by: INTERNAL MEDICINE

## 2020-10-08 RX ORDER — AMLODIPINE BESYLATE 5 MG/1
5 TABLET ORAL DAILY
Qty: 90 TABLET | Refills: 1 | Status: SHIPPED | OUTPATIENT
Start: 2020-10-08 | End: 2021-02-22

## 2020-10-08 RX ORDER — FAMOTIDINE 20 MG/1
20 TABLET ORAL 2 TIMES DAILY
Qty: 60 TABLET | Refills: 1 | COMMUNITY
Start: 2020-10-08 | End: 2020-12-11

## 2020-10-08 RX ORDER — LOSARTAN POTASSIUM 50 MG/1
50 TABLET ORAL DAILY
Qty: 90 TABLET | Refills: 1 | Status: SHIPPED | OUTPATIENT
Start: 2020-10-08 | End: 2021-02-22

## 2020-10-08 NOTE — PROGRESS NOTES
HPI:    Patient ID: Azul Scott is a 77year old female. Gastro-esophageal Reflux  She complains of heartburn.  She reports no abdominal pain, no belching, no chest pain, no choking, no coughing, no dysphagia, no early satiety, no globus sensatio 2 (two) times daily. 60 tablet 1   • amLODIPine Besylate 5 MG Oral Tab Take 1 tablet (5 mg total) by mouth daily. 90 tablet 1   • losartan Potassium 50 MG Oral Tab Take 1 tablet (50 mg total) by mouth daily.  90 tablet 1   • Multiple Vitamin (MULTI-VITAMIN) Refills   • amLODIPine Besylate 5 MG Oral Tab 90 tablet 1     Sig: Take 1 tablet (5 mg total) by mouth daily. • losartan Potassium 50 MG Oral Tab 90 tablet 1     Sig: Take 1 tablet (50 mg total) by mouth daily.        Imaging & Referrals:  FLU VACC HIGH D

## 2020-11-16 ENCOUNTER — APPOINTMENT (OUTPATIENT)
Dept: LAB | Age: 70
End: 2020-11-16
Attending: INTERNAL MEDICINE
Payer: MEDICARE

## 2020-11-16 DIAGNOSIS — Z01.818 PREOP EXAMINATION: ICD-10-CM

## 2020-11-18 PROBLEM — K29.60 EROSIVE GASTRITIS: Status: ACTIVE | Noted: 2020-11-18

## 2020-11-18 PROBLEM — Z86.0101 HISTORY OF ADENOMATOUS POLYP OF COLON: Status: ACTIVE | Noted: 2020-11-18

## 2020-11-18 PROBLEM — R12 HEARTBURN: Status: ACTIVE | Noted: 2020-11-18

## 2020-11-18 PROBLEM — Z86.010 HISTORY OF ADENOMATOUS POLYP OF COLON: Status: ACTIVE | Noted: 2020-11-18

## 2020-11-18 PROBLEM — Z85.038 PERSONAL HISTORY OF COLON CANCER: Status: ACTIVE | Noted: 2020-11-18

## 2020-11-18 PROCEDURE — 88305 TISSUE EXAM BY PATHOLOGIST: CPT | Performed by: INTERNAL MEDICINE

## 2020-12-01 ENCOUNTER — TELEPHONE (OUTPATIENT)
Dept: INTERNAL MEDICINE CLINIC | Facility: CLINIC | Age: 70
End: 2020-12-01

## 2020-12-01 DIAGNOSIS — N30.00 ACUTE CYSTITIS WITHOUT HEMATURIA: Primary | ICD-10-CM

## 2020-12-01 RX ORDER — NITROFURANTOIN 25; 75 MG/1; MG/1
100 CAPSULE ORAL 2 TIMES DAILY
Qty: 10 CAPSULE | Refills: 0 | Status: SHIPPED | OUTPATIENT
Start: 2020-12-01 | End: 2020-12-06

## 2020-12-01 NOTE — TELEPHONE ENCOUNTER
Pt cannot come in for appt. Per Dr. Lian Pendleton Macrobid 100mg BID x 5 days. D/w pt above she expressed understanding. Rx sent.

## 2020-12-01 NOTE — TELEPHONE ENCOUNTER
Pt thinks she has a bladder infection:  Frequent urination with little coming out, burning. She is asking a a script can be sent in to [place] without being seen.

## 2020-12-03 ENCOUNTER — TELEPHONE (OUTPATIENT)
Dept: INTERNAL MEDICINE CLINIC | Facility: CLINIC | Age: 70
End: 2020-12-03

## 2020-12-03 DIAGNOSIS — I70.0 ABDOMINAL AORTIC ATHEROSCLEROSIS (HCC): ICD-10-CM

## 2020-12-03 DIAGNOSIS — I10 ESSENTIAL HYPERTENSION: Primary | ICD-10-CM

## 2020-12-03 DIAGNOSIS — M81.0 AGE-RELATED OSTEOPOROSIS WITHOUT CURRENT PATHOLOGICAL FRACTURE: ICD-10-CM

## 2020-12-03 NOTE — TELEPHONE ENCOUNTER
Pt is scheduled for an OV on 12/11 and is asking if Dr. Rip Miller wants her to have labs done before appt.

## 2020-12-08 ENCOUNTER — LAB ENCOUNTER (OUTPATIENT)
Dept: LAB | Age: 70
End: 2020-12-08
Attending: INTERNAL MEDICINE
Payer: MEDICARE

## 2020-12-08 DIAGNOSIS — I10 ESSENTIAL HYPERTENSION: ICD-10-CM

## 2020-12-08 DIAGNOSIS — I70.0 ABDOMINAL AORTIC ATHEROSCLEROSIS (HCC): ICD-10-CM

## 2020-12-08 DIAGNOSIS — M81.0 AGE-RELATED OSTEOPOROSIS WITHOUT CURRENT PATHOLOGICAL FRACTURE: ICD-10-CM

## 2020-12-08 PROCEDURE — 85025 COMPLETE CBC W/AUTO DIFF WBC: CPT

## 2020-12-08 PROCEDURE — 81001 URINALYSIS AUTO W/SCOPE: CPT

## 2020-12-08 PROCEDURE — 80061 LIPID PANEL: CPT

## 2020-12-08 PROCEDURE — 36415 COLL VENOUS BLD VENIPUNCTURE: CPT

## 2020-12-08 PROCEDURE — 80053 COMPREHEN METABOLIC PANEL: CPT

## 2020-12-11 ENCOUNTER — OFFICE VISIT (OUTPATIENT)
Dept: INTERNAL MEDICINE CLINIC | Facility: CLINIC | Age: 70
End: 2020-12-11
Payer: MEDICARE

## 2020-12-11 VITALS
OXYGEN SATURATION: 97 % | BODY MASS INDEX: 17.72 KG/M2 | SYSTOLIC BLOOD PRESSURE: 134 MMHG | RESPIRATION RATE: 16 BRPM | HEART RATE: 92 BPM | HEIGHT: 63 IN | DIASTOLIC BLOOD PRESSURE: 68 MMHG | TEMPERATURE: 98 F | WEIGHT: 100 LBS

## 2020-12-11 DIAGNOSIS — F17.200 TOBACCO DEPENDENCE: ICD-10-CM

## 2020-12-11 DIAGNOSIS — M81.0 AGE-RELATED OSTEOPOROSIS WITHOUT CURRENT PATHOLOGICAL FRACTURE: ICD-10-CM

## 2020-12-11 DIAGNOSIS — I10 ESSENTIAL HYPERTENSION: Primary | ICD-10-CM

## 2020-12-11 PROBLEM — Z86.0101 HISTORY OF ADENOMATOUS POLYP OF COLON: Status: RESOLVED | Noted: 2020-11-18 | Resolved: 2020-12-11

## 2020-12-11 PROBLEM — R12 HEARTBURN: Status: RESOLVED | Noted: 2020-11-18 | Resolved: 2020-12-11

## 2020-12-11 PROBLEM — K29.60 EROSIVE GASTRITIS: Status: RESOLVED | Noted: 2020-11-18 | Resolved: 2020-12-11

## 2020-12-11 PROBLEM — Z85.038 PERSONAL HISTORY OF COLON CANCER: Status: RESOLVED | Noted: 2020-11-18 | Resolved: 2020-12-11

## 2020-12-11 PROBLEM — Z86.010 HISTORY OF ADENOMATOUS POLYP OF COLON: Status: RESOLVED | Noted: 2020-11-18 | Resolved: 2020-12-11

## 2020-12-11 PROCEDURE — 3008F BODY MASS INDEX DOCD: CPT | Performed by: INTERNAL MEDICINE

## 2020-12-11 PROCEDURE — 99213 OFFICE O/P EST LOW 20 MIN: CPT | Performed by: INTERNAL MEDICINE

## 2020-12-11 PROCEDURE — 3075F SYST BP GE 130 - 139MM HG: CPT | Performed by: INTERNAL MEDICINE

## 2020-12-11 PROCEDURE — 3078F DIAST BP <80 MM HG: CPT | Performed by: INTERNAL MEDICINE

## 2020-12-11 RX ORDER — MULTIVIT-MIN/IRON/FOLIC ACID/K 18-600-40
CAPSULE ORAL DAILY
COMMUNITY

## 2020-12-11 NOTE — PROGRESS NOTES
HPI:    Patient ID: Mark Johnson is a 72year old female. Hypertension      Mark Johnson is a 72year old female. HPI:   Patient presents for recheck of her hypertension.  Pt has been taking medications as instructed, no medication side Diagnosis Date   • Abdominal distention Many years   • Allergic rhinitis    • Anxiety    • Bloating Many years   • Cancer [facility])    • [facility]    surgery   • Easy bruising Have always   • Flatulence/gas pain/belching Many years   • Food int atraumatic, normocephalic,ears and throat are clear  NECK: supple,no adenopathy,no bruits  LUNGS: clear to auscultation  CARDIO: RRR without murmur  GI: good BS's,no masses, HSM or tenderness  EXTREMITIES: no cyanosis, clubbing or edema    ASSESSMENT AND P

## 2021-02-20 DIAGNOSIS — I10 ESSENTIAL HYPERTENSION: ICD-10-CM

## 2021-02-22 RX ORDER — AMLODIPINE BESYLATE 5 MG/1
TABLET ORAL
Qty: 90 TABLET | Refills: 1 | Status: SHIPPED | OUTPATIENT
Start: 2021-02-22 | End: 2021-06-14

## 2021-02-22 RX ORDER — LOSARTAN POTASSIUM 50 MG/1
TABLET ORAL
Qty: 90 TABLET | Refills: 1 | Status: SHIPPED | OUTPATIENT
Start: 2021-02-22 | End: 2021-06-14 | Stop reason: DRUGHIGH

## 2021-03-12 DIAGNOSIS — Z23 NEED FOR VACCINATION: ICD-10-CM

## 2021-03-19 NOTE — PATIENT INSTRUCTIONS
5/3/2021       TO:  Dr. Ashley Gutierres MD   [address] [facility] [address]  Phone: None  Fax: None      RE:  Yudy Crockett   : 1956               Home Phone: [phone] (home)    Your patient, Yudy Crockett, is scheduled for the following surgical procedure with Dr. Bill Stovall on 2021 at [facility].      Planned Procedure:     L45 Laminectomy & Fusion    This procedure will be performed under General anesthesia.    Please perform and send results for the following pre-operative test/requirements.  · History & Physical with statement of Medical Clearance for planned procedure.  · - Complete Metabolic Panel  · - CBC  · - Urinalysis  · - Chest X-ray  · - EKG  · - MRSA   · Anything else you feel is medically necessary    Please note: The OR PreAdmission/Anesthesia teams need written confirmation of clearance updated in the patient's chart in order to proceed as scheduled.  In addition, H&P must include clear reference to the name of planned procedure and surgeon (referenced above).     Please fax the results at least 5-7 days before surgery to :  · PreAdmission Testing at: [facility] at [phone]     · STAT Scanning at [phone].    If you have any questions or concerns, please feel free to contact me during routine business hours.    Sincerely,    Janki at [phone]/Surgery Scheduler for Dr. [facility]     Low-Salt Choices  Eating salt (sodium) can make your body retain too much water. Extra water makes your heart work harder. Canned, packaged, and frozen foods are easy to prepare. But they are often high in sodium.  Here are some ideas for low-salt foods y

## 2021-06-02 ENCOUNTER — TELEPHONE (OUTPATIENT)
Dept: INTERNAL MEDICINE CLINIC | Facility: CLINIC | Age: 71
End: 2021-06-02

## 2021-06-02 DIAGNOSIS — Z00.00 ANNUAL PHYSICAL EXAM: Primary | ICD-10-CM

## 2021-06-02 NOTE — TELEPHONE ENCOUNTER
Patient requesting for lab orders to be placed prior to her upcoming appointment. Please place orders if appropriate.      Future Appointments   Date Time Provider Miguel Luisa   6/11/2021 10:30 AM Dean Ceja MD [facility] [address]

## 2021-06-08 ENCOUNTER — LAB ENCOUNTER (OUTPATIENT)
Dept: LAB | Age: 71
End: 2021-06-08
Attending: INTERNAL MEDICINE
Payer: MEDICARE

## 2021-06-08 DIAGNOSIS — Z00.00 ANNUAL PHYSICAL EXAM: ICD-10-CM

## 2021-06-08 PROCEDURE — 81003 URINALYSIS AUTO W/O SCOPE: CPT

## 2021-06-08 PROCEDURE — 80053 COMPREHEN METABOLIC PANEL: CPT

## 2021-06-08 PROCEDURE — 36415 COLL VENOUS BLD VENIPUNCTURE: CPT

## 2021-06-08 PROCEDURE — 80061 LIPID PANEL: CPT

## 2021-06-08 PROCEDURE — 82306 VITAMIN D 25 HYDROXY: CPT

## 2021-06-08 PROCEDURE — 83036 HEMOGLOBIN GLYCOSYLATED A1C: CPT

## 2021-06-08 PROCEDURE — 85025 COMPLETE CBC W/AUTO DIFF WBC: CPT

## 2021-06-08 PROCEDURE — 84443 ASSAY THYROID STIM HORMONE: CPT

## 2021-06-11 ENCOUNTER — OFFICE VISIT (OUTPATIENT)
Dept: INTERNAL MEDICINE CLINIC | Facility: CLINIC | Age: 71
End: 2021-06-11
Payer: MEDICARE

## 2021-06-11 VITALS
BODY MASS INDEX: 17.01 KG/M2 | OXYGEN SATURATION: 98 % | HEART RATE: 87 BPM | WEIGHT: 96 LBS | RESPIRATION RATE: 16 BRPM | SYSTOLIC BLOOD PRESSURE: 132 MMHG | DIASTOLIC BLOOD PRESSURE: 74 MMHG | TEMPERATURE: 98 F | HEIGHT: 63 IN

## 2021-06-11 DIAGNOSIS — Z91.89 10 YEAR RISK OF MI OR STROKE 7.5% OR GREATER: ICD-10-CM

## 2021-06-11 DIAGNOSIS — Z85.038 HISTORY OF COLON CANCER, STAGE I: ICD-10-CM

## 2021-06-11 DIAGNOSIS — F17.200 TOBACCO DEPENDENCE: ICD-10-CM

## 2021-06-11 DIAGNOSIS — Z00.00 ANNUAL PHYSICAL EXAM: Primary | ICD-10-CM

## 2021-06-11 DIAGNOSIS — Z78.0 POSTMENOPAUSAL: ICD-10-CM

## 2021-06-11 DIAGNOSIS — I10 ESSENTIAL HYPERTENSION: ICD-10-CM

## 2021-06-11 DIAGNOSIS — R60.9 DEPENDENT EDEMA: ICD-10-CM

## 2021-06-11 DIAGNOSIS — Z00.00 ENCOUNTER FOR ANNUAL HEALTH EXAMINATION: ICD-10-CM

## 2021-06-11 DIAGNOSIS — J30.1 NON-SEASONAL ALLERGIC RHINITIS DUE TO POLLEN: ICD-10-CM

## 2021-06-11 DIAGNOSIS — I70.0 ABDOMINAL AORTIC ATHEROSCLEROSIS (HCC): ICD-10-CM

## 2021-06-11 DIAGNOSIS — E22.2 SIADH (SYNDROME OF INAPPROPRIATE ADH PRODUCTION) (HCC): ICD-10-CM

## 2021-06-11 DIAGNOSIS — J41.0 SMOKERS' COUGH (HCC): ICD-10-CM

## 2021-06-11 DIAGNOSIS — Z12.31 ENCOUNTER FOR SCREENING MAMMOGRAM FOR MALIGNANT NEOPLASM OF BREAST: ICD-10-CM

## 2021-06-11 DIAGNOSIS — M81.0 AGE-RELATED OSTEOPOROSIS WITHOUT CURRENT PATHOLOGICAL FRACTURE: ICD-10-CM

## 2021-06-11 PROBLEM — R45.89 ANXIETY ABOUT HEALTH: Status: RESOLVED | Noted: 2019-03-26 | Resolved: 2021-06-11

## 2021-06-11 PROBLEM — F41.8 ANXIETY ABOUT HEALTH: Status: RESOLVED | Noted: 2019-03-26 | Resolved: 2021-06-11

## 2021-06-11 PROCEDURE — G0439 PPPS, SUBSEQ VISIT: HCPCS | Performed by: INTERNAL MEDICINE

## 2021-06-11 PROCEDURE — 3008F BODY MASS INDEX DOCD: CPT | Performed by: INTERNAL MEDICINE

## 2021-06-11 PROCEDURE — 96160 PT-FOCUSED HLTH RISK ASSMT: CPT | Performed by: INTERNAL MEDICINE

## 2021-06-11 PROCEDURE — 3075F SYST BP GE 130 - 139MM HG: CPT | Performed by: INTERNAL MEDICINE

## 2021-06-11 PROCEDURE — 99397 PER PM REEVAL EST PAT 65+ YR: CPT | Performed by: INTERNAL MEDICINE

## 2021-06-11 PROCEDURE — 3078F DIAST BP <80 MM HG: CPT | Performed by: INTERNAL MEDICINE

## 2021-06-11 RX ORDER — PYRIDOXINE HCL (VITAMIN B6) 100 MG
TABLET ORAL DAILY
COMMUNITY
End: 2021-07-21 | Stop reason: DRUGHIGH

## 2021-06-11 NOTE — PATIENT INSTRUCTIONS
Ana Zhang's SCREENING SCHEDULE   Tests on this list are recommended by your physician but may not be covered, or covered at this frequency, by your insurer. Please check with your insurance carrier before scheduling to verify coverage.    PREV (CPT=77080) 08/30/2017      No recommendations at this time   Pap and Pelvic    Pap   Covered every 2 years for women at normal risk;  Annually if at high risk -  No recommendations at this time    Chlamydia Annually if high risk -  No recommendations at th from the [facility]. http://[email or web address] [place]/public/books/advin.htm  A link to the [facility].  This site has a lot of good information including definitions of the different types of Advance Directive

## 2021-06-11 NOTE — PROGRESS NOTES
HPI:   Yoel Edwards is a 72year old female who presents for a MA (Medicare Advantage) [facility] [facility] (Once per calendar year).     HPI:  Here for HRA  No new complaints  Adherent with meds  Has notice some L>R ankle swelling that improve with leg elevat Medicine)  Kaylene Ching MD (GASTROENTEROLOGY)    Patient Active Problem List:     History of colon cancer, stage I sigmoid, 2013 s/p resection     Non-seasonal allergic rhinitis due to pollen     Essential hypertension     Age-related osteoporosis wit years), Cancer [facility] (2013), Easy bruising (Have always), Flatulence/gas pain/belching (Many years), Food intolerance (Many years), Heartburn (June 1), Hemorrhoids (6 years gone now), High cholesterol, Irregular bowel habits (4-5 years) Estimated body mass index is 17.01 kg/m² as calculated from the following:    Height as of this encounter: 5' 3\" (1.6 m). Weight as of this encounter: 96 lb (43.5 kg).     Medicare Hearing Assessment  (Required for AWV/SWV)    Hearing Screening    Scre 10/08/2020   • Fluzone Vaccine Medicare () 10/08/2020   • Pneumococcal (Prevnar 13) 09/05/2014   • Pneumovax 23 03/15/2013, 01/19/2017   • TDAP 09/20/2013   Deferred Date(s) Deferred   • >=3 YRS TRI  MULTIDOSE VIAL (69694) FLU CLINIC 01/19/2017, 03/06 (HCC)-stable. continue control of cad risk factors     Smokers' cough ([facility]- smoking cessation education given to pt . She declines    The patient indicates understanding of these issues and agrees to the plan. Continue with current treatment plan.   Reinf Aortic Aneurysm (AAA) Covered once in a lifetime for one of the following risk factors   • Men who are 73-68 years old and have ever smoked   • Anyone with a family history 08/30/2017     Colorectal Cancer Screening  Covered for ages 52-80; only need ONE o Zoster Not covered by Medicare Part B; may be covered with your pharmacy  prescription benefits -  Zoster Vaccines(1 of 2) Never done        Annual Monitoring of Persistent Medications (ACE/ARB, digoxin diuretics, anticonvulsants)    Potassium Annually Lab

## 2021-06-14 ENCOUNTER — TELEPHONE (OUTPATIENT)
Dept: INTERNAL MEDICINE CLINIC | Facility: CLINIC | Age: 71
End: 2021-06-14

## 2021-06-14 DIAGNOSIS — I10 ESSENTIAL HYPERTENSION: Primary | ICD-10-CM

## 2021-06-14 DIAGNOSIS — R60.9 EDEMA, UNSPECIFIED TYPE: ICD-10-CM

## 2021-06-14 RX ORDER — LOSARTAN POTASSIUM 100 MG/1
100 TABLET ORAL DAILY
Qty: 30 TABLET | Refills: 0 | Status: SHIPPED | OUTPATIENT
Start: 2021-06-14 | End: 2021-06-23

## 2021-06-14 RX ORDER — FUROSEMIDE 20 MG/1
20 TABLET ORAL DAILY
Qty: 3 TABLET | Refills: 0 | Status: SHIPPED | OUTPATIENT
Start: 2021-06-14 | End: 2021-06-17

## 2021-06-14 NOTE — TELEPHONE ENCOUNTER
LMOVM to COB     Patient reports B/L ankle swelling.   Left side is more swollen than right side    Denies:  Leg elevation   Or using compression stockings      Amlodipine 5 mg daily  Losartan 50 mg daily       Per Dr. Santana Cabot  Leg elevation   Use of compressi

## 2021-06-14 NOTE — TELEPHONE ENCOUNTER
Pt would like to get off her BP medication due to the increased swelling it is causing. Please call to discuss     What are her alternative options?

## 2021-06-22 ENCOUNTER — TELEPHONE (OUTPATIENT)
Dept: INTERNAL MEDICINE CLINIC | Facility: CLINIC | Age: 71
End: 2021-06-22

## 2021-06-22 NOTE — TELEPHONE ENCOUNTER
Pt is calling in to give her BP reading for the week starting 6/15-6/21.    6/15: Morning- 122/66 Evening-109/59  6/16: Morning -105/61 Evening-106/54  6/17:Morning-111/61 Evening-N/A  6/18:Morning-112/62 Evening-115/58  6/19:Morning-112/60 Evening-115/56

## 2021-06-23 ENCOUNTER — TELEPHONE (OUTPATIENT)
Dept: INTERNAL MEDICINE CLINIC | Facility: CLINIC | Age: 71
End: 2021-06-23

## 2021-06-23 ENCOUNTER — HOSPITAL ENCOUNTER (OUTPATIENT)
Dept: BONE DENSITY | Age: 71
Discharge: HOME | End: 2021-06-23
Attending: INTERNAL MEDICINE
Payer: MEDICARE

## 2021-06-23 ENCOUNTER — HOSPITAL ENCOUNTER (OUTPATIENT)
Dept: MAMMOGRAPHY | Age: 71
Discharge: HOME | End: 2021-06-23
Attending: INTERNAL MEDICINE
Payer: MEDICARE

## 2021-06-23 DIAGNOSIS — I10 ESSENTIAL HYPERTENSION: ICD-10-CM

## 2021-06-23 DIAGNOSIS — Z78.0 POSTMENOPAUSAL: ICD-10-CM

## 2021-06-23 DIAGNOSIS — Z12.31 ENCOUNTER FOR SCREENING MAMMOGRAM FOR MALIGNANT NEOPLASM OF BREAST: ICD-10-CM

## 2021-06-23 PROCEDURE — 77080 DXA BONE DENSITY AXIAL: CPT | Performed by: INTERNAL MEDICINE

## 2021-06-23 PROCEDURE — 77063 BREAST TOMOSYNTHESIS BI: CPT | Performed by: INTERNAL MEDICINE

## 2021-06-23 PROCEDURE — 77067 SCR MAMMO BI INCL CAD: CPT | Performed by: INTERNAL MEDICINE

## 2021-06-23 RX ORDER — LOSARTAN POTASSIUM 100 MG/1
100 TABLET ORAL DAILY
Qty: 90 TABLET | Refills: 0 | Status: SHIPPED | OUTPATIENT
Start: 2021-06-23 | End: 2021-08-19

## 2021-06-23 NOTE — TELEPHONE ENCOUNTER
Pt is requesting refill request for losartan 100 MG Oral Tab be sent to [facility] mail delivery on file.

## 2021-06-25 NOTE — PROGRESS NOTES
Discussed results and recommendations with pt at this time she declines MBI or ultrasound.   Understanding expressed

## 2021-06-25 NOTE — PROGRESS NOTES
Discussed results and recommendations with pt. She has additional questions for Dr. Ifeoma Anna and will receive a call back next week.    Understanding expressed

## 2021-06-28 ENCOUNTER — TELEPHONE (OUTPATIENT)
Dept: INTERNAL MEDICINE CLINIC | Facility: CLINIC | Age: 71
End: 2021-06-28

## 2021-06-28 DIAGNOSIS — R92.2 DENSE BREAST TISSUE: Primary | ICD-10-CM

## 2021-06-28 DIAGNOSIS — M81.0 AGE-RELATED OSTEOPOROSIS WITHOUT CURRENT PATHOLOGICAL FRACTURE: ICD-10-CM

## 2021-06-28 NOTE — TELEPHONE ENCOUNTER
See Dexa Results and Dr. Carrie Aguilar recommendation for Reclast. Pt had concerns with this as Fosamax caused acid reflux. She is also inquiring about how much calcium she should be intaking on a daily basis.     Per Dr. Lazar Lightning will not cause acid reflux a

## 2021-06-29 NOTE — TELEPHONE ENCOUNTER
Referral placed for Reclast.  Awaiting authorization then will need to fax auth, order and dexa scan to [facility]. Call patient as well.

## 2021-07-06 ENCOUNTER — TELEPHONE (OUTPATIENT)
Dept: INTERNAL MEDICINE CLINIC | Facility: CLINIC | Age: 71
End: 2021-07-06

## 2021-07-06 NOTE — TELEPHONE ENCOUNTER
Please complete form & fax back:     [facility]  Ph: [phone]  Fx: [phone]    Girish Romero Order Form     Form in triage

## 2021-07-07 NOTE — TELEPHONE ENCOUNTER
Faxed Reclast order(s), signed by Dr Chen Velasquez, to [facility]. I left a message to patient's voicemail informing order faxed and the cancer center will reach out to schedule.

## 2021-07-12 ENCOUNTER — TELEPHONE (OUTPATIENT)
Dept: INTERNAL MEDICINE CLINIC | Facility: CLINIC | Age: 71
End: 2021-07-12

## 2021-07-12 NOTE — TELEPHONE ENCOUNTER
Pt asking if reclast would worsen her indigestion, pt made aware reclast bypasses GI and therefore will not impact it.   Pt was not given any instruction on stopping vitamins or calcium, per Rukhsana Bedoya pt would be provided more instruction upon scheduling but ca

## 2021-07-12 NOTE — TELEPHONE ENCOUNTER
Patient calling in stating she has questions about Reclast Infusion. Patient inquiring if she can still take her calcium and other vitamins the morning prior to infusion. Patient inquiring if treatment will make her \"acid indigestion worse\".      Etta

## 2021-07-15 ENCOUNTER — OFFICE VISIT (OUTPATIENT)
Dept: HEMATOLOGY/ONCOLOGY | Facility: HOSPITAL | Age: 71
End: 2021-07-15
Attending: INTERNAL MEDICINE
Payer: MEDICARE

## 2021-07-15 VITALS
WEIGHT: 96.81 LBS | BODY MASS INDEX: 17 KG/M2 | SYSTOLIC BLOOD PRESSURE: 153 MMHG | DIASTOLIC BLOOD PRESSURE: 70 MMHG | TEMPERATURE: 99 F | HEART RATE: 93 BPM | OXYGEN SATURATION: 98 % | RESPIRATION RATE: 18 BRPM

## 2021-07-15 DIAGNOSIS — M81.0 AGE-RELATED OSTEOPOROSIS WITHOUT CURRENT PATHOLOGICAL FRACTURE: Primary | ICD-10-CM

## 2021-07-15 LAB
ALBUMIN SERPL-MCNC: 3.3 G/DL (ref 3.4–5)
CALCIUM BLD-MCNC: 8.6 MG/DL (ref 8.5–10.1)
CREAT BLD-MCNC: 0.57 MG/DL

## 2021-07-15 PROCEDURE — 96365 THER/PROPH/DIAG IV INF INIT: CPT

## 2021-07-15 PROCEDURE — 82040 ASSAY OF SERUM ALBUMIN: CPT

## 2021-07-15 PROCEDURE — 82310 ASSAY OF CALCIUM: CPT

## 2021-07-15 PROCEDURE — 36415 COLL VENOUS BLD VENIPUNCTURE: CPT

## 2021-07-15 PROCEDURE — 82565 ASSAY OF CREATININE: CPT

## 2021-07-15 RX ORDER — ZOLEDRONIC ACID 5 MG/100ML
5 INJECTION, SOLUTION INTRAVENOUS ONCE
Status: CANCELLED | OUTPATIENT
Start: 2021-07-15

## 2021-07-15 RX ORDER — ZOLEDRONIC ACID 5 MG/100ML
5 INJECTION, SOLUTION INTRAVENOUS ONCE
Status: COMPLETED | OUTPATIENT
Start: 2021-07-15 | End: 2021-07-15

## 2021-07-15 RX ADMIN — ZOLEDRONIC ACID 5 MG: 5 INJECTION, SOLUTION INTRAVENOUS at 11:53:00

## 2021-07-15 NOTE — PROGRESS NOTES
Education Record    Learner:  Patient    Disease / Diagnosis: osteoporosis - reclast infusion. Tolerated infusion well.      Barriers / Limitations:  None   Comments:    Method:  Brief focused   Comments:    General Topics:  Plan of care reviewed   Comments

## 2021-07-15 NOTE — PATIENT INSTRUCTIONS
Reclast Injection 0.05 mg/mL  Uses  This medicine is used for the following purposes:  · bone disease  · bone strength  Instructions  This is an IV medicine. It is given through a sterile tube directly into the vein by a healthcare provider.   This medici very important that you keep all appointments for medical exams and tests while on this medicine. Cautions  Tell your doctor and pharmacist if you ever had an allergic reaction to a medicine.  Symptoms of an allergic reaction can include trouble breathing, any other medicines without first speaking to your doctor or pharmacist.  This medicine can cause serious side effects in some patients. Important information from the [place]  Food and Drug Administration (FDA) is available from your pharmacist. Please review or pharmacist may give you other documents about your medicine. Please talk to them if you have any questions. Always follow their advice. There is a more complete description of this medicine available in [place]. Scan this code on your smartphone or tablet

## 2021-07-19 RX ORDER — AMLODIPINE BESYLATE 5 MG/1
TABLET ORAL
Qty: 90 TABLET | Refills: 0 | OUTPATIENT
Start: 2021-07-19

## 2021-07-21 ENCOUNTER — OFFICE VISIT (OUTPATIENT)
Dept: INTERNAL MEDICINE CLINIC | Facility: CLINIC | Age: 71
End: 2021-07-21
Payer: MEDICARE

## 2021-07-21 VITALS
HEART RATE: 101 BPM | DIASTOLIC BLOOD PRESSURE: 72 MMHG | HEIGHT: 63 IN | WEIGHT: 95 LBS | OXYGEN SATURATION: 97 % | BODY MASS INDEX: 16.83 KG/M2 | RESPIRATION RATE: 16 BRPM | TEMPERATURE: 98 F | SYSTOLIC BLOOD PRESSURE: 130 MMHG

## 2021-07-21 DIAGNOSIS — R60.0 EDEMA, LOWER EXTREMITY: Primary | ICD-10-CM

## 2021-07-21 DIAGNOSIS — I73.9 CLAUDICATION (HCC): ICD-10-CM

## 2021-07-21 PROCEDURE — 3078F DIAST BP <80 MM HG: CPT | Performed by: INTERNAL MEDICINE

## 2021-07-21 PROCEDURE — 99214 OFFICE O/P EST MOD 30 MIN: CPT | Performed by: INTERNAL MEDICINE

## 2021-07-21 PROCEDURE — 3075F SYST BP GE 130 - 139MM HG: CPT | Performed by: INTERNAL MEDICINE

## 2021-07-21 PROCEDURE — 3008F BODY MASS INDEX DOCD: CPT | Performed by: INTERNAL MEDICINE

## 2021-07-21 NOTE — PROGRESS NOTES
HPI/Subjective:   Patient ID: Elena Arriaza is a 72year old female. Ankle Pain       Worsening lower extremity edema. L>R. However now left leg consistent with claudication. Pain with activity. + tobacco hx.   Has tried diuretic for edema relief b Mental Status: She is alert and oriented to person, place, and time.    Psychiatric:         Mood and Affect: Mood normal.         Behavior: Behavior normal.         Assessment & Plan:   Edema, lower extremity  (primary encounter diagnosis)  Claudication (H

## 2021-08-09 ENCOUNTER — HOSPITAL ENCOUNTER (OUTPATIENT)
Dept: ULTRASOUND IMAGING | Facility: HOSPITAL | Age: 71
Discharge: HOME | End: 2021-08-09
Attending: INTERNAL MEDICINE
Payer: MEDICARE

## 2021-08-09 DIAGNOSIS — I73.9 CLAUDICATION (HCC): ICD-10-CM

## 2021-08-09 PROCEDURE — 93925 LOWER EXTREMITY STUDY: CPT | Performed by: INTERNAL MEDICINE

## 2021-08-13 ENCOUNTER — HOSPITAL ENCOUNTER (OUTPATIENT)
Dept: ULTRASOUND IMAGING | Age: 71
Discharge: HOME | End: 2021-08-13
Attending: INTERNAL MEDICINE
Payer: MEDICARE

## 2021-08-13 DIAGNOSIS — R60.0 EDEMA, LOWER EXTREMITY: ICD-10-CM

## 2021-08-13 PROCEDURE — 93970 EXTREMITY STUDY: CPT | Performed by: INTERNAL MEDICINE

## 2021-08-18 DIAGNOSIS — I10 ESSENTIAL HYPERTENSION: ICD-10-CM

## 2021-08-19 RX ORDER — LOSARTAN POTASSIUM 100 MG/1
100 TABLET ORAL DAILY
Qty: 90 TABLET | Refills: 0 | Status: SHIPPED | OUTPATIENT
Start: 2021-08-19 | End: 2021-10-29

## 2021-08-20 ENCOUNTER — OFFICE VISIT (OUTPATIENT)
Dept: INTERNAL MEDICINE CLINIC | Facility: CLINIC | Age: 71
End: 2021-08-20
Payer: MEDICARE

## 2021-08-20 VITALS
OXYGEN SATURATION: 97 % | RESPIRATION RATE: 16 BRPM | SYSTOLIC BLOOD PRESSURE: 128 MMHG | DIASTOLIC BLOOD PRESSURE: 68 MMHG | HEIGHT: 63 IN | TEMPERATURE: 98 F | HEART RATE: 82 BPM | BODY MASS INDEX: 16.66 KG/M2 | WEIGHT: 94 LBS

## 2021-08-20 DIAGNOSIS — I10 ESSENTIAL HYPERTENSION: Primary | ICD-10-CM

## 2021-08-20 PROCEDURE — 3078F DIAST BP <80 MM HG: CPT | Performed by: INTERNAL MEDICINE

## 2021-08-20 PROCEDURE — 3008F BODY MASS INDEX DOCD: CPT | Performed by: INTERNAL MEDICINE

## 2021-08-20 PROCEDURE — 3074F SYST BP LT 130 MM HG: CPT | Performed by: INTERNAL MEDICINE

## 2021-08-20 PROCEDURE — 99213 OFFICE O/P EST LOW 20 MIN: CPT | Performed by: INTERNAL MEDICINE

## 2021-08-20 RX ORDER — FAMOTIDINE 20 MG/1
20 TABLET ORAL DAILY
COMMUNITY
End: 2021-12-10

## 2021-08-20 NOTE — PROGRESS NOTES
HPI/Subjective:   Patient ID: Ashli Garvin is a 72year old female. HPI  Ashli Garvin is a 72year old female. HPI:   Patient presents for recheck of her hypertension.  Pt has been taking medications as instructed, no medication side ef mouth daily as needed.           Past Medical History:   Diagnosis Date   • Abdominal distention Many years   • Allergic rhinitis    • Anxiety    • Bloating Many years   • Cancer [facility])    • [facility]    surgery   • Easy bruising Have always nourished,in no apparent distress  SKIN: no rashes,no suspicious lesions  HEENT: atraumatic, normocephalic,ears and throat are clear  NECK: supple,no adenopathy,no bruits  LUNGS: clear to auscultation  CARDIO: RRR without murmur  GI: good BS's,no masses, H

## 2021-10-28 DIAGNOSIS — I10 ESSENTIAL HYPERTENSION: ICD-10-CM

## 2021-10-29 RX ORDER — LOSARTAN POTASSIUM 100 MG/1
TABLET ORAL
Qty: 90 TABLET | Refills: 1 | Status: SHIPPED | OUTPATIENT
Start: 2021-10-29 | End: 2022-05-02

## 2021-12-10 ENCOUNTER — OFFICE VISIT (OUTPATIENT)
Dept: INTERNAL MEDICINE CLINIC | Facility: CLINIC | Age: 71
End: 2021-12-10
Payer: MEDICARE

## 2021-12-10 ENCOUNTER — LAB ENCOUNTER (OUTPATIENT)
Dept: LAB | Age: 71
End: 2021-12-10
Attending: INTERNAL MEDICINE
Payer: MEDICARE

## 2021-12-10 VITALS
HEART RATE: 101 BPM | BODY MASS INDEX: 17.54 KG/M2 | OXYGEN SATURATION: 96 % | DIASTOLIC BLOOD PRESSURE: 70 MMHG | TEMPERATURE: 98 F | HEIGHT: 63 IN | SYSTOLIC BLOOD PRESSURE: 136 MMHG | WEIGHT: 99 LBS | RESPIRATION RATE: 16 BRPM

## 2021-12-10 DIAGNOSIS — F17.200 TOBACCO DEPENDENCE: ICD-10-CM

## 2021-12-10 DIAGNOSIS — I10 ESSENTIAL HYPERTENSION: Primary | ICD-10-CM

## 2021-12-10 DIAGNOSIS — K21.9 GASTROESOPHAGEAL REFLUX DISEASE WITHOUT ESOPHAGITIS: ICD-10-CM

## 2021-12-10 DIAGNOSIS — I10 ESSENTIAL HYPERTENSION: ICD-10-CM

## 2021-12-10 PROCEDURE — 81001 URINALYSIS AUTO W/SCOPE: CPT

## 2021-12-10 PROCEDURE — 36415 COLL VENOUS BLD VENIPUNCTURE: CPT

## 2021-12-10 PROCEDURE — 3075F SYST BP GE 130 - 139MM HG: CPT | Performed by: INTERNAL MEDICINE

## 2021-12-10 PROCEDURE — 80053 COMPREHEN METABOLIC PANEL: CPT

## 2021-12-10 PROCEDURE — 3008F BODY MASS INDEX DOCD: CPT | Performed by: INTERNAL MEDICINE

## 2021-12-10 PROCEDURE — 3078F DIAST BP <80 MM HG: CPT | Performed by: INTERNAL MEDICINE

## 2021-12-10 PROCEDURE — 99214 OFFICE O/P EST MOD 30 MIN: CPT | Performed by: INTERNAL MEDICINE

## 2021-12-10 RX ORDER — PANTOPRAZOLE SODIUM 40 MG/1
40 TABLET, DELAYED RELEASE ORAL
Qty: 90 TABLET | Refills: 0 | Status: SHIPPED | OUTPATIENT
Start: 2021-12-10

## 2021-12-10 NOTE — PROGRESS NOTES
Subjective:   Patient ID: Iliana Crandall is a 80year old female. Hypertension    Gastro-esophageal Reflux      Iliana Crandall is a 80year old female. HPI:   Patient presents for recheck of her hypertension.  Pt has been taking medications • Fluticasone Propionate (FLONASE NA) by Nasal route as needed. • loratadine 10 MG Oral Tab Take 10 mg by mouth daily as needed.           Past Medical History:   Diagnosis Date   • Abdominal distention Many years   • Allergic rhinitis    • Anxiety Ht 5' 3\" (1.6 m)   Wt 99 lb (44.9 kg)   SpO2 96%   BMI 17.54 kg/m²   GENERAL: well developed, well nourished,in no apparent distress  SKIN: no rashes,no suspicious lesions  HEENT: atraumatic, normocephalic,ears and throat are clear  NECK: supple,no adenop Plan:   Essential hypertension  (primary encounter diagnosis)  Tobacco dependence  Gastroesophageal reflux disease without esophagitis    Orders Placed This Encounter      Comp Metabolic Panel (14)      Urinalysis, Routine      Meds This Visit:  Requested

## 2021-12-11 DIAGNOSIS — N30.90 CYSTITIS: Primary | ICD-10-CM

## 2021-12-11 RX ORDER — NITROFURANTOIN 25; 75 MG/1; MG/1
100 CAPSULE ORAL 2 TIMES DAILY
Qty: 10 CAPSULE | Refills: 0 | Status: SHIPPED | OUTPATIENT
Start: 2021-12-11

## 2021-12-11 NOTE — PROGRESS NOTES
Labs reviewed with via phone  Mild hypokalemia. Asked pt to eat banana daily  Mild AST elevation suspect fatty liver and poor diet. Pt will be more adherent with diet  UA =cystitis.  macrobid 100 mg bid x 5 days sent to pharmacy

## 2022-05-02 RX ORDER — LOSARTAN POTASSIUM 100 MG/1
TABLET ORAL
Qty: 90 TABLET | Refills: 1 | Status: SHIPPED | OUTPATIENT
Start: 2022-05-02

## 2022-05-09 ENCOUNTER — TELEPHONE (OUTPATIENT)
Dept: INTERNAL MEDICINE CLINIC | Facility: CLINIC | Age: 72
End: 2022-05-09

## 2022-05-09 NOTE — TELEPHONE ENCOUNTER
Spoke to pt, reported she is having high BP and she feels the symptoms but do not have measures to take BP readings, she gave her BP cuff to her daughter  Pt is requesting to restart her Amlodipine which was discontinued last year 6/14/21 due to BLE swelling  Pt stated she has headaches and some times her eyes crosses when her BP is elevated and she had this symptoms in the past as well  To talk to Dr. Deandra Matthew, ok to increase BP

## 2022-05-09 NOTE — TELEPHONE ENCOUNTER
Patient called in stating she would like to get back on medication AMLODIPINE BESYLATE 5 MG Oral Tab. Patient stated she has been having high BP and feels the losartan is not helping. Patient would like to speak to Rn regarding this issue.

## 2022-05-10 RX ORDER — AMLODIPINE BESYLATE 5 MG/1
5 TABLET ORAL DAILY
Qty: 90 TABLET | Refills: 0 | Status: SHIPPED | OUTPATIENT
Start: 2022-05-10

## 2022-06-16 ENCOUNTER — TELEPHONE (OUTPATIENT)
Dept: INTERNAL MEDICINE CLINIC | Facility: CLINIC | Age: 72
End: 2022-06-16

## 2022-06-16 DIAGNOSIS — I70.0 ABDOMINAL AORTIC ATHEROSCLEROSIS (HCC): ICD-10-CM

## 2022-06-16 DIAGNOSIS — I10 ESSENTIAL HYPERTENSION: Primary | ICD-10-CM

## 2022-06-16 DIAGNOSIS — M81.0 AGE-RELATED OSTEOPOROSIS WITHOUT CURRENT PATHOLOGICAL FRACTURE: ICD-10-CM

## 2022-06-16 NOTE — TELEPHONE ENCOUNTER
Requesting 6 month lab orders     [facility])

## 2022-06-16 NOTE — TELEPHONE ENCOUNTER
Orders placed. Contacted patient and advised her to be fasting 8-10 hrs and to drink water. Pt verbalized understanding.      Future Appointments   Date Time Provider Miguel Moran   6/28/2022  1:30 PM Edson Ruiz MD [facility] [address]

## 2022-06-21 ENCOUNTER — LAB ENCOUNTER (OUTPATIENT)
Dept: LAB | Age: 72
End: 2022-06-21
Attending: INTERNAL MEDICINE
Payer: MEDICARE

## 2022-06-21 DIAGNOSIS — I70.0 ABDOMINAL AORTIC ATHEROSCLEROSIS (HCC): ICD-10-CM

## 2022-06-21 DIAGNOSIS — M81.0 AGE-RELATED OSTEOPOROSIS WITHOUT CURRENT PATHOLOGICAL FRACTURE: ICD-10-CM

## 2022-06-21 DIAGNOSIS — I10 ESSENTIAL HYPERTENSION: ICD-10-CM

## 2022-06-21 LAB
ALBUMIN SERPL-MCNC: 4.1 G/DL (ref 3.4–5)
ALBUMIN/GLOB SERPL: 1.2 {RATIO} (ref 1–2)
ALP LIVER SERPL-CCNC: 90 U/L
ALT SERPL-CCNC: 27 U/L
ANION GAP SERPL CALC-SCNC: 10 MMOL/L (ref 0–18)
AST SERPL-CCNC: 29 U/L (ref 15–37)
BASOPHILS # BLD AUTO: 0.05 X10(3) UL (ref 0–0.2)
BASOPHILS NFR BLD AUTO: 0.9 %
BILIRUB SERPL-MCNC: 0.6 MG/DL (ref 0.1–2)
BILIRUB UR QL STRIP.AUTO: NEGATIVE
BUN BLD-MCNC: 8 MG/DL (ref 7–18)
CALCIUM BLD-MCNC: 9.3 MG/DL (ref 8.5–10.1)
CHLORIDE SERPL-SCNC: 94 MMOL/L (ref 98–112)
CHOLEST SERPL-MCNC: 249 MG/DL (ref ?–200)
CLARITY UR REFRACT.AUTO: CLEAR
CO2 SERPL-SCNC: 23 MMOL/L (ref 21–32)
COLOR UR AUTO: YELLOW
CREAT BLD-MCNC: 0.55 MG/DL
EOSINOPHIL # BLD AUTO: 0.11 X10(3) UL (ref 0–0.7)
EOSINOPHIL NFR BLD AUTO: 2 %
ERYTHROCYTE [DISTWIDTH] IN BLOOD BY AUTOMATED COUNT: 13.2 %
FASTING PATIENT LIPID ANSWER: YES
FASTING STATUS PATIENT QL REPORTED: YES
GLOBULIN PLAS-MCNC: 3.5 G/DL (ref 2.8–4.4)
GLUCOSE BLD-MCNC: 99 MG/DL (ref 70–99)
GLUCOSE UR STRIP.AUTO-MCNC: NEGATIVE MG/DL
HCT VFR BLD AUTO: 42.5 %
HDLC SERPL-MCNC: 171 MG/DL (ref 40–59)
HGB BLD-MCNC: 14.3 G/DL
IMM GRANULOCYTES # BLD AUTO: 0.02 X10(3) UL (ref 0–1)
IMM GRANULOCYTES NFR BLD: 0.4 %
KETONES UR STRIP.AUTO-MCNC: NEGATIVE MG/DL
LDLC SERPL CALC-MCNC: 67 MG/DL (ref ?–100)
LYMPHOCYTES # BLD AUTO: 0.92 X10(3) UL (ref 1–4)
LYMPHOCYTES NFR BLD AUTO: 17.1 %
MCH RBC QN AUTO: 31.2 PG (ref 26–34)
MCHC RBC AUTO-ENTMCNC: 33.6 G/DL (ref 31–37)
MCV RBC AUTO: 92.8 FL
MONOCYTES # BLD AUTO: 0.63 X10(3) UL (ref 0.1–1)
MONOCYTES NFR BLD AUTO: 11.7 %
NEUTROPHILS # BLD AUTO: 3.65 X10 (3) UL (ref 1.5–7.7)
NEUTROPHILS # BLD AUTO: 3.65 X10(3) UL (ref 1.5–7.7)
NEUTROPHILS NFR BLD AUTO: 67.9 %
NITRITE UR QL STRIP.AUTO: NEGATIVE
NONHDLC SERPL-MCNC: 78 MG/DL (ref ?–130)
OSMOLALITY SERPL CALC.SUM OF ELEC: 262 MOSM/KG (ref 275–295)
PH UR STRIP.AUTO: 6 [PH] (ref 5–8)
PLATELET # BLD AUTO: 252 10(3)UL (ref 150–450)
POTASSIUM SERPL-SCNC: 4.1 MMOL/L (ref 3.5–5.1)
PROT SERPL-MCNC: 7.6 G/DL (ref 6.4–8.2)
PROT UR STRIP.AUTO-MCNC: NEGATIVE MG/DL
RBC # BLD AUTO: 4.58 X10(6)UL
RBC UR QL AUTO: NEGATIVE
SODIUM SERPL-SCNC: 127 MMOL/L (ref 136–145)
SP GR UR STRIP.AUTO: 1.01 (ref 1–1.03)
TRIGL SERPL-MCNC: 71 MG/DL (ref 30–149)
TSI SER-ACNC: 1.19 MIU/ML (ref 0.36–3.74)
UROBILINOGEN UR STRIP.AUTO-MCNC: <2 MG/DL
VLDLC SERPL CALC-MCNC: 11 MG/DL (ref 0–30)
WBC # BLD AUTO: 5.4 X10(3) UL (ref 4–11)
YEAST UR QL: PRESENT /HPF

## 2022-06-21 PROCEDURE — 85025 COMPLETE CBC W/AUTO DIFF WBC: CPT

## 2022-06-21 PROCEDURE — 36415 COLL VENOUS BLD VENIPUNCTURE: CPT

## 2022-06-21 PROCEDURE — 84443 ASSAY THYROID STIM HORMONE: CPT

## 2022-06-21 PROCEDURE — 80061 LIPID PANEL: CPT

## 2022-06-21 PROCEDURE — 80053 COMPREHEN METABOLIC PANEL: CPT

## 2022-06-21 PROCEDURE — 81001 URINALYSIS AUTO W/SCOPE: CPT

## 2022-06-28 ENCOUNTER — OFFICE VISIT (OUTPATIENT)
Dept: INTERNAL MEDICINE CLINIC | Facility: CLINIC | Age: 72
End: 2022-06-28
Payer: MEDICARE

## 2022-06-28 VITALS
OXYGEN SATURATION: 98 % | DIASTOLIC BLOOD PRESSURE: 78 MMHG | BODY MASS INDEX: 19.49 KG/M2 | WEIGHT: 110 LBS | HEART RATE: 88 BPM | SYSTOLIC BLOOD PRESSURE: 132 MMHG | TEMPERATURE: 98 F | RESPIRATION RATE: 16 BRPM | HEIGHT: 63 IN

## 2022-06-28 DIAGNOSIS — I70.0 ABDOMINAL AORTIC ATHEROSCLEROSIS (HCC): ICD-10-CM

## 2022-06-28 DIAGNOSIS — M81.0 AGE-RELATED OSTEOPOROSIS WITHOUT CURRENT PATHOLOGICAL FRACTURE: ICD-10-CM

## 2022-06-28 DIAGNOSIS — J30.1 NON-SEASONAL ALLERGIC RHINITIS DUE TO POLLEN: ICD-10-CM

## 2022-06-28 DIAGNOSIS — Z85.038 HISTORY OF COLON CANCER, STAGE I: ICD-10-CM

## 2022-06-28 DIAGNOSIS — J41.0 SMOKERS' COUGH (HCC): ICD-10-CM

## 2022-06-28 DIAGNOSIS — I10 ESSENTIAL HYPERTENSION: ICD-10-CM

## 2022-06-28 DIAGNOSIS — Z00.00 ENCOUNTER FOR ANNUAL HEALTH EXAMINATION: ICD-10-CM

## 2022-06-28 DIAGNOSIS — Z91.89 10 YEAR RISK OF MI OR STROKE 7.5% OR GREATER: ICD-10-CM

## 2022-06-28 DIAGNOSIS — F17.200 TOBACCO DEPENDENCE: ICD-10-CM

## 2022-06-28 DIAGNOSIS — Z00.00 ANNUAL PHYSICAL EXAM: Primary | ICD-10-CM

## 2022-06-28 DIAGNOSIS — Z12.31 ENCOUNTER FOR SCREENING MAMMOGRAM FOR MALIGNANT NEOPLASM OF BREAST: ICD-10-CM

## 2022-06-28 DIAGNOSIS — E78.2 MODERATE MIXED HYPERLIPIDEMIA NOT REQUIRING STATIN THERAPY: ICD-10-CM

## 2022-06-28 DIAGNOSIS — E44.1 MILD PROTEIN-CALORIE MALNUTRITION (HCC): ICD-10-CM

## 2022-06-28 DIAGNOSIS — E22.2 SIADH (SYNDROME OF INAPPROPRIATE ADH PRODUCTION) (HCC): ICD-10-CM

## 2022-06-28 PROBLEM — E46 PROTEIN-CALORIE MALNUTRITION, UNSPECIFIED SEVERITY (HCC): Status: RESOLVED | Noted: 2022-06-28 | Resolved: 2022-06-28

## 2022-06-28 PROBLEM — R60.9 DEPENDENT EDEMA: Status: RESOLVED | Noted: 2021-06-11 | Resolved: 2022-06-28

## 2022-06-28 PROBLEM — E46 PROTEIN-CALORIE MALNUTRITION, UNSPECIFIED SEVERITY (HCC): Status: ACTIVE | Noted: 2022-06-28

## 2022-06-28 PROCEDURE — 3008F BODY MASS INDEX DOCD: CPT | Performed by: INTERNAL MEDICINE

## 2022-06-28 PROCEDURE — 96160 PT-FOCUSED HLTH RISK ASSMT: CPT | Performed by: INTERNAL MEDICINE

## 2022-06-28 PROCEDURE — 1126F AMNT PAIN NOTED NONE PRSNT: CPT | Performed by: INTERNAL MEDICINE

## 2022-06-28 PROCEDURE — 3078F DIAST BP <80 MM HG: CPT | Performed by: INTERNAL MEDICINE

## 2022-06-28 PROCEDURE — 99397 PER PM REEVAL EST PAT 65+ YR: CPT | Performed by: INTERNAL MEDICINE

## 2022-06-28 PROCEDURE — 3075F SYST BP GE 130 - 139MM HG: CPT | Performed by: INTERNAL MEDICINE

## 2022-06-28 PROCEDURE — G0439 PPPS, SUBSEQ VISIT: HCPCS | Performed by: INTERNAL MEDICINE

## 2022-12-13 DIAGNOSIS — I10 ESSENTIAL HYPERTENSION: ICD-10-CM

## 2022-12-13 RX ORDER — LOSARTAN POTASSIUM 100 MG/1
TABLET ORAL
Qty: 90 TABLET | Refills: 1 | Status: SHIPPED | OUTPATIENT
Start: 2022-12-13

## 2023-01-03 ENCOUNTER — TELEPHONE (OUTPATIENT)
Dept: INTERNAL MEDICINE CLINIC | Facility: CLINIC | Age: 73
End: 2023-01-03

## 2023-01-03 DIAGNOSIS — Z91.89 10 YEAR RISK OF MI OR STROKE 7.5% OR GREATER: ICD-10-CM

## 2023-01-03 DIAGNOSIS — I10 ESSENTIAL HYPERTENSION: Primary | ICD-10-CM

## 2023-01-03 DIAGNOSIS — E78.2 MODERATE MIXED HYPERLIPIDEMIA NOT REQUIRING STATIN THERAPY: ICD-10-CM

## 2023-01-03 NOTE — TELEPHONE ENCOUNTER
Orders placed.        Future Appointments   Date Time Provider Miguel Luisa   1/4/2023 11:20 AM [facility] [place] [facility]   1/10/2023  1:00 PM Linda Rolon MD [facility] [address]

## 2023-01-04 ENCOUNTER — HOSPITAL ENCOUNTER (OUTPATIENT)
Dept: MAMMOGRAPHY | Age: 73
Discharge: HOME | End: 2023-01-04
Attending: INTERNAL MEDICINE
Payer: MEDICARE

## 2023-01-04 DIAGNOSIS — Z12.31 ENCOUNTER FOR SCREENING MAMMOGRAM FOR MALIGNANT NEOPLASM OF BREAST: ICD-10-CM

## 2023-01-04 PROCEDURE — 77067 SCR MAMMO BI INCL CAD: CPT | Performed by: INTERNAL MEDICINE

## 2023-01-04 PROCEDURE — 77063 BREAST TOMOSYNTHESIS BI: CPT | Performed by: INTERNAL MEDICINE

## 2023-01-05 ENCOUNTER — LAB ENCOUNTER (OUTPATIENT)
Dept: LAB | Age: 73
End: 2023-01-05
Attending: INTERNAL MEDICINE
Payer: MEDICARE

## 2023-01-05 DIAGNOSIS — I10 ESSENTIAL HYPERTENSION: ICD-10-CM

## 2023-01-05 DIAGNOSIS — E78.2 MODERATE MIXED HYPERLIPIDEMIA NOT REQUIRING STATIN THERAPY: ICD-10-CM

## 2023-01-05 DIAGNOSIS — Z91.89 10 YEAR RISK OF MI OR STROKE 7.5% OR GREATER: ICD-10-CM

## 2023-01-05 LAB
ALBUMIN SERPL-MCNC: 4 G/DL (ref 3.4–5)
ALBUMIN/GLOB SERPL: 1.1 {RATIO} (ref 1–2)
ALP LIVER SERPL-CCNC: 100 U/L
ALT SERPL-CCNC: 29 U/L
ANION GAP SERPL CALC-SCNC: 4 MMOL/L (ref 0–18)
AST SERPL-CCNC: 30 U/L (ref 15–37)
BASOPHILS # BLD AUTO: 0.03 X10(3) UL (ref 0–0.2)
BASOPHILS NFR BLD AUTO: 0.5 %
BILIRUB SERPL-MCNC: 0.5 MG/DL (ref 0.1–2)
BILIRUB UR QL STRIP.AUTO: NEGATIVE
BUN BLD-MCNC: 7 MG/DL (ref 7–18)
CALCIUM BLD-MCNC: 9.3 MG/DL (ref 8.5–10.1)
CHLORIDE SERPL-SCNC: 97 MMOL/L (ref 98–112)
CHOLEST SERPL-MCNC: 252 MG/DL (ref ?–200)
CLARITY UR REFRACT.AUTO: CLEAR
CO2 SERPL-SCNC: 27 MMOL/L (ref 21–32)
COLOR UR AUTO: YELLOW
CREAT BLD-MCNC: 0.6 MG/DL
EOSINOPHIL # BLD AUTO: 0.09 X10(3) UL (ref 0–0.7)
EOSINOPHIL NFR BLD AUTO: 1.6 %
ERYTHROCYTE [DISTWIDTH] IN BLOOD BY AUTOMATED COUNT: 12.5 %
EST. AVERAGE GLUCOSE BLD GHB EST-MCNC: 114 MG/DL (ref 68–126)
FASTING PATIENT LIPID ANSWER: YES
FASTING STATUS PATIENT QL REPORTED: YES
GFR SERPLBLD BASED ON 1.73 SQ M-ARVRAT: 96 ML/MIN/1.73M2 (ref 60–?)
GLOBULIN PLAS-MCNC: 3.5 G/DL (ref 2.8–4.4)
GLUCOSE BLD-MCNC: 110 MG/DL (ref 70–99)
GLUCOSE UR STRIP.AUTO-MCNC: NEGATIVE MG/DL
HBA1C MFR BLD: 5.6 % (ref ?–5.7)
HCT VFR BLD AUTO: 43.1 %
HDLC SERPL-MCNC: 156 MG/DL (ref 40–59)
HGB BLD-MCNC: 14.8 G/DL
IMM GRANULOCYTES # BLD AUTO: 0.01 X10(3) UL (ref 0–1)
IMM GRANULOCYTES NFR BLD: 0.2 %
KETONES UR STRIP.AUTO-MCNC: NEGATIVE MG/DL
LDLC SERPL CALC-MCNC: 85 MG/DL (ref ?–100)
LEUKOCYTE ESTERASE UR QL STRIP.AUTO: NEGATIVE
LYMPHOCYTES # BLD AUTO: 0.9 X10(3) UL (ref 1–4)
LYMPHOCYTES NFR BLD AUTO: 16.2 %
MCH RBC QN AUTO: 32 PG (ref 26–34)
MCHC RBC AUTO-ENTMCNC: 34.3 G/DL (ref 31–37)
MCV RBC AUTO: 93.3 FL
MONOCYTES # BLD AUTO: 0.73 X10(3) UL (ref 0.1–1)
MONOCYTES NFR BLD AUTO: 13.1 %
NEUTROPHILS # BLD AUTO: 3.8 X10 (3) UL (ref 1.5–7.7)
NEUTROPHILS # BLD AUTO: 3.8 X10(3) UL (ref 1.5–7.7)
NEUTROPHILS NFR BLD AUTO: 68.4 %
NITRITE UR QL STRIP.AUTO: NEGATIVE
NONHDLC SERPL-MCNC: 96 MG/DL (ref ?–130)
OSMOLALITY SERPL CALC.SUM OF ELEC: 265 MOSM/KG (ref 275–295)
PH UR STRIP.AUTO: 7 [PH] (ref 5–8)
PLATELET # BLD AUTO: 268 10(3)UL (ref 150–450)
POTASSIUM SERPL-SCNC: 3.8 MMOL/L (ref 3.5–5.1)
PROT SERPL-MCNC: 7.5 G/DL (ref 6.4–8.2)
PROT UR STRIP.AUTO-MCNC: NEGATIVE MG/DL
RBC # BLD AUTO: 4.62 X10(6)UL
RBC UR QL AUTO: NEGATIVE
SODIUM SERPL-SCNC: 128 MMOL/L (ref 136–145)
SP GR UR STRIP.AUTO: 1.01 (ref 1–1.03)
TRIGL SERPL-MCNC: 71 MG/DL (ref 30–149)
UROBILINOGEN UR STRIP.AUTO-MCNC: <2 MG/DL
VLDLC SERPL CALC-MCNC: 11 MG/DL (ref 0–30)
WBC # BLD AUTO: 5.6 X10(3) UL (ref 4–11)

## 2023-01-05 PROCEDURE — 81003 URINALYSIS AUTO W/O SCOPE: CPT

## 2023-01-05 PROCEDURE — 36415 COLL VENOUS BLD VENIPUNCTURE: CPT

## 2023-01-05 PROCEDURE — 83036 HEMOGLOBIN GLYCOSYLATED A1C: CPT

## 2023-01-05 PROCEDURE — 80053 COMPREHEN METABOLIC PANEL: CPT

## 2023-01-05 PROCEDURE — 85025 COMPLETE CBC W/AUTO DIFF WBC: CPT

## 2023-01-05 PROCEDURE — 80061 LIPID PANEL: CPT

## 2023-01-10 ENCOUNTER — OFFICE VISIT (OUTPATIENT)
Dept: INTERNAL MEDICINE CLINIC | Facility: CLINIC | Age: 73
End: 2023-01-10
Payer: MEDICARE

## 2023-01-10 VITALS
SYSTOLIC BLOOD PRESSURE: 130 MMHG | HEART RATE: 78 BPM | WEIGHT: 111 LBS | DIASTOLIC BLOOD PRESSURE: 80 MMHG | RESPIRATION RATE: 16 BRPM | TEMPERATURE: 98 F | HEIGHT: 63 IN | OXYGEN SATURATION: 98 % | BODY MASS INDEX: 19.67 KG/M2

## 2023-01-10 DIAGNOSIS — Z85.038 HISTORY OF COLON CANCER, STAGE I: ICD-10-CM

## 2023-01-10 DIAGNOSIS — Z00.00 ENCOUNTER FOR ANNUAL HEALTH EXAMINATION: ICD-10-CM

## 2023-01-10 DIAGNOSIS — J30.1 NON-SEASONAL ALLERGIC RHINITIS DUE TO POLLEN: ICD-10-CM

## 2023-01-10 DIAGNOSIS — I10 ESSENTIAL HYPERTENSION: ICD-10-CM

## 2023-01-10 DIAGNOSIS — E44.1 MILD PROTEIN-CALORIE MALNUTRITION (HCC): ICD-10-CM

## 2023-01-10 DIAGNOSIS — M81.0 AGE-RELATED OSTEOPOROSIS WITHOUT CURRENT PATHOLOGICAL FRACTURE: ICD-10-CM

## 2023-01-10 DIAGNOSIS — E22.2 SIADH (SYNDROME OF INAPPROPRIATE ADH PRODUCTION) (HCC): ICD-10-CM

## 2023-01-10 DIAGNOSIS — Z91.89 10 YEAR RISK OF MI OR STROKE 7.5% OR GREATER: ICD-10-CM

## 2023-01-10 DIAGNOSIS — I70.0 ABDOMINAL AORTIC ATHEROSCLEROSIS (HCC): ICD-10-CM

## 2023-01-10 DIAGNOSIS — J41.0 SMOKERS' COUGH (HCC): ICD-10-CM

## 2023-01-10 DIAGNOSIS — Z00.00 ANNUAL PHYSICAL EXAM: Primary | ICD-10-CM

## 2023-01-10 DIAGNOSIS — F17.200 TOBACCO DEPENDENCE: ICD-10-CM

## 2023-01-10 DIAGNOSIS — E78.2 MODERATE MIXED HYPERLIPIDEMIA NOT REQUIRING STATIN THERAPY: ICD-10-CM

## 2023-01-10 PROCEDURE — 3079F DIAST BP 80-89 MM HG: CPT | Performed by: INTERNAL MEDICINE

## 2023-01-10 PROCEDURE — 96160 PT-FOCUSED HLTH RISK ASSMT: CPT | Performed by: INTERNAL MEDICINE

## 2023-01-10 PROCEDURE — G0439 PPPS, SUBSEQ VISIT: HCPCS | Performed by: INTERNAL MEDICINE

## 2023-01-10 PROCEDURE — 3008F BODY MASS INDEX DOCD: CPT | Performed by: INTERNAL MEDICINE

## 2023-01-10 PROCEDURE — 99397 PER PM REEVAL EST PAT 65+ YR: CPT | Performed by: INTERNAL MEDICINE

## 2023-01-10 PROCEDURE — 1126F AMNT PAIN NOTED NONE PRSNT: CPT | Performed by: INTERNAL MEDICINE

## 2023-01-10 PROCEDURE — 3075F SYST BP GE 130 - 139MM HG: CPT | Performed by: INTERNAL MEDICINE

## 2023-07-31 DIAGNOSIS — I10 ESSENTIAL HYPERTENSION: ICD-10-CM

## 2023-07-31 RX ORDER — LOSARTAN POTASSIUM 100 MG/1
100 TABLET ORAL DAILY
Qty: 90 TABLET | Refills: 1 | Status: SHIPPED | OUTPATIENT
Start: 2023-07-31

## 2023-07-31 NOTE — TELEPHONE ENCOUNTER
Last time medication was refilled 12/13/2022  Quantity and # of refills 90 w/ 1  Last OV 01/10/2023  Next OV 01/09/2024    Medication failed protocol. Sent to Dr. Anai Turner for approval.     Steve Vásquezk to coordinate Follow Up appt with pt.

## 2024-01-04 ENCOUNTER — TELEPHONE (OUTPATIENT)
Dept: INTERNAL MEDICINE CLINIC | Facility: CLINIC | Age: 74
End: 2024-01-04

## 2024-01-04 DIAGNOSIS — Z12.31 SCREENING MAMMOGRAM FOR BREAST CANCER: Primary | ICD-10-CM

## 2024-01-04 DIAGNOSIS — Z00.00 ENCOUNTER FOR ANNUAL HEALTH EXAMINATION: ICD-10-CM

## 2024-01-04 NOTE — TELEPHONE ENCOUNTER
[facility] lab   Lab work and Mammogram Order   LOV: 1/10/23  Next OV & Reason:  1/16/24 HRA   Patient was notified to fast 8-10 hours drinking only water/black coffee prior to having labs drawn and may need to submit urine sample.  Hemoglobin A1C will be ordered if patient has diabetes or prediabetes.  Lab orders will be placed by end of day:  Yes  Patient requesting A1C: No  Patient informed insurance may not cover A1C and they may be responsible for cost if denied by insurance:  Yes  Patient requesting return call:  No

## 2024-01-09 NOTE — TELEPHONE ENCOUNTER
Preferred Lab: [facility]  LOV: 1/10/23  Next OV & Reason:  1/16/24 HRA   Patient was notified to fast 8-10 hours drinking only water/black coffee prior to having labs drawn and may need to submit urine sample.  Hemoglobin A1C will be ordered if patient has diabetes or prediabetes.  Lab orders will be placed by end of day:  yes  Patient requesting return call:  no

## 2024-01-11 ENCOUNTER — LAB ENCOUNTER (OUTPATIENT)
Dept: LAB | Age: 74
End: 2024-01-11
Attending: INTERNAL MEDICINE
Payer: MEDICARE

## 2024-01-11 DIAGNOSIS — Z00.00 ENCOUNTER FOR ANNUAL HEALTH EXAMINATION: ICD-10-CM

## 2024-01-11 LAB
ALBUMIN SERPL-MCNC: 3.9 G/DL (ref 3.4–5)
ALBUMIN/GLOB SERPL: 1.1 {RATIO} (ref 1–2)
ALP LIVER SERPL-CCNC: 94 U/L
ALT SERPL-CCNC: 27 U/L
ANION GAP SERPL CALC-SCNC: 5 MMOL/L (ref 0–18)
AST SERPL-CCNC: 22 U/L (ref 15–37)
BASOPHILS # BLD AUTO: 0.03 X10(3) UL (ref 0–0.2)
BASOPHILS NFR BLD AUTO: 0.5 %
BILIRUB SERPL-MCNC: 0.5 MG/DL (ref 0.1–2)
BILIRUB UR QL STRIP.AUTO: NEGATIVE
BUN BLD-MCNC: 6 MG/DL (ref 9–23)
CALCIUM BLD-MCNC: 9 MG/DL (ref 8.5–10.1)
CHLORIDE SERPL-SCNC: 97 MMOL/L (ref 98–112)
CHOLEST SERPL-MCNC: 213 MG/DL (ref ?–200)
CLARITY UR REFRACT.AUTO: CLEAR
CO2 SERPL-SCNC: 27 MMOL/L (ref 21–32)
CREAT BLD-MCNC: 0.75 MG/DL
EGFRCR SERPLBLD CKD-EPI 2021: 85 ML/MIN/1.73M2 (ref 60–?)
EOSINOPHIL # BLD AUTO: 0.12 X10(3) UL (ref 0–0.7)
EOSINOPHIL NFR BLD AUTO: 2.1 %
ERYTHROCYTE [DISTWIDTH] IN BLOOD BY AUTOMATED COUNT: 12.6 %
FASTING PATIENT LIPID ANSWER: YES
FASTING STATUS PATIENT QL REPORTED: YES
GLOBULIN PLAS-MCNC: 3.5 G/DL (ref 2.8–4.4)
GLUCOSE BLD-MCNC: 105 MG/DL (ref 70–99)
GLUCOSE UR STRIP.AUTO-MCNC: NORMAL MG/DL
HCT VFR BLD AUTO: 43.9 %
HDLC SERPL-MCNC: 137 MG/DL (ref 40–59)
HGB BLD-MCNC: 14.5 G/DL
IMM GRANULOCYTES # BLD AUTO: 0.02 X10(3) UL (ref 0–1)
IMM GRANULOCYTES NFR BLD: 0.4 %
KETONES UR STRIP.AUTO-MCNC: NEGATIVE MG/DL
LDLC SERPL CALC-MCNC: 65 MG/DL (ref ?–100)
LEUKOCYTE ESTERASE UR QL STRIP.AUTO: NEGATIVE
LYMPHOCYTES # BLD AUTO: 1.16 X10(3) UL (ref 1–4)
LYMPHOCYTES NFR BLD AUTO: 20.6 %
MCH RBC QN AUTO: 30.9 PG (ref 26–34)
MCHC RBC AUTO-ENTMCNC: 33 G/DL (ref 31–37)
MCV RBC AUTO: 93.6 FL
MONOCYTES # BLD AUTO: 0.74 X10(3) UL (ref 0.1–1)
MONOCYTES NFR BLD AUTO: 13.1 %
NEUTROPHILS # BLD AUTO: 3.56 X10 (3) UL (ref 1.5–7.7)
NEUTROPHILS # BLD AUTO: 3.56 X10(3) UL (ref 1.5–7.7)
NEUTROPHILS NFR BLD AUTO: 63.3 %
NITRITE UR QL STRIP.AUTO: NEGATIVE
NONHDLC SERPL-MCNC: 76 MG/DL (ref ?–130)
OSMOLALITY SERPL CALC.SUM OF ELEC: 266 MOSM/KG (ref 275–295)
PH UR STRIP.AUTO: 6.5 [PH] (ref 5–8)
PLATELET # BLD AUTO: 301 10(3)UL (ref 150–450)
POTASSIUM SERPL-SCNC: 4.2 MMOL/L (ref 3.5–5.1)
PROT SERPL-MCNC: 7.4 G/DL (ref 6.4–8.2)
PROT UR STRIP.AUTO-MCNC: NEGATIVE MG/DL
RBC # BLD AUTO: 4.69 X10(6)UL
RBC UR QL AUTO: NEGATIVE
SODIUM SERPL-SCNC: 129 MMOL/L (ref 136–145)
SP GR UR STRIP.AUTO: 1.01 (ref 1–1.03)
TRIGL SERPL-MCNC: 65 MG/DL (ref 30–149)
TSI SER-ACNC: 1.57 MIU/ML (ref 0.36–3.74)
UROBILINOGEN UR STRIP.AUTO-MCNC: NORMAL MG/DL
VLDLC SERPL CALC-MCNC: 10 MG/DL (ref 0–30)
WBC # BLD AUTO: 5.6 X10(3) UL (ref 4–11)

## 2024-01-11 PROCEDURE — 36415 COLL VENOUS BLD VENIPUNCTURE: CPT

## 2024-01-11 PROCEDURE — 84443 ASSAY THYROID STIM HORMONE: CPT

## 2024-01-11 PROCEDURE — 85025 COMPLETE CBC W/AUTO DIFF WBC: CPT

## 2024-01-11 PROCEDURE — 81003 URINALYSIS AUTO W/O SCOPE: CPT

## 2024-01-11 PROCEDURE — 80061 LIPID PANEL: CPT

## 2024-01-11 PROCEDURE — 80053 COMPREHEN METABOLIC PANEL: CPT

## 2024-01-16 ENCOUNTER — OFFICE VISIT (OUTPATIENT)
Dept: INTERNAL MEDICINE CLINIC | Facility: CLINIC | Age: 74
End: 2024-01-16
Payer: MEDICARE

## 2024-01-16 VITALS
WEIGHT: 113 LBS | BODY MASS INDEX: 20.02 KG/M2 | SYSTOLIC BLOOD PRESSURE: 122 MMHG | DIASTOLIC BLOOD PRESSURE: 64 MMHG | RESPIRATION RATE: 16 BRPM | HEIGHT: 63 IN | OXYGEN SATURATION: 94 % | HEART RATE: 101 BPM | TEMPERATURE: 97 F

## 2024-01-16 DIAGNOSIS — F17.200 TOBACCO DEPENDENCE: ICD-10-CM

## 2024-01-16 DIAGNOSIS — E22.2 SIADH (SYNDROME OF INAPPROPRIATE ADH PRODUCTION) (HCC): ICD-10-CM

## 2024-01-16 DIAGNOSIS — Z91.89 10 YEAR RISK OF MI OR STROKE 7.5% OR GREATER: ICD-10-CM

## 2024-01-16 DIAGNOSIS — Z85.038 HISTORY OF COLON CANCER, STAGE I: ICD-10-CM

## 2024-01-16 DIAGNOSIS — Z00.00 ANNUAL PHYSICAL EXAM: Primary | ICD-10-CM

## 2024-01-16 DIAGNOSIS — I20.89 ATYPICAL ANGINA: ICD-10-CM

## 2024-01-16 DIAGNOSIS — E78.2 MODERATE MIXED HYPERLIPIDEMIA NOT REQUIRING STATIN THERAPY: ICD-10-CM

## 2024-01-16 DIAGNOSIS — J30.1 NON-SEASONAL ALLERGIC RHINITIS DUE TO POLLEN: ICD-10-CM

## 2024-01-16 DIAGNOSIS — Z00.00 ENCOUNTER FOR ANNUAL HEALTH EXAMINATION: ICD-10-CM

## 2024-01-16 DIAGNOSIS — I70.0 ABDOMINAL AORTIC ATHEROSCLEROSIS (HCC): ICD-10-CM

## 2024-01-16 DIAGNOSIS — R06.02 SOB (SHORTNESS OF BREATH): ICD-10-CM

## 2024-01-16 DIAGNOSIS — R45.89 ANXIETY ABOUT HEALTH: ICD-10-CM

## 2024-01-16 DIAGNOSIS — M81.0 AGE-RELATED OSTEOPOROSIS WITHOUT CURRENT PATHOLOGICAL FRACTURE: ICD-10-CM

## 2024-01-16 DIAGNOSIS — I10 ESSENTIAL HYPERTENSION: ICD-10-CM

## 2024-01-16 PROBLEM — J41.0 SMOKERS' COUGH (HCC): Chronic | Status: RESOLVED | Noted: 2019-03-11 | Resolved: 2024-01-16

## 2024-01-16 PROBLEM — E44.1 MILD PROTEIN-CALORIE MALNUTRITION (HCC): Status: RESOLVED | Noted: 2022-06-28 | Resolved: 2024-01-16

## 2024-01-16 PROCEDURE — 1126F AMNT PAIN NOTED NONE PRSNT: CPT | Performed by: INTERNAL MEDICINE

## 2024-01-16 PROCEDURE — 1170F FXNL STATUS ASSESSED: CPT | Performed by: INTERNAL MEDICINE

## 2024-01-16 PROCEDURE — 3074F SYST BP LT 130 MM HG: CPT | Performed by: INTERNAL MEDICINE

## 2024-01-16 PROCEDURE — G0439 PPPS, SUBSEQ VISIT: HCPCS | Performed by: INTERNAL MEDICINE

## 2024-01-16 PROCEDURE — 3008F BODY MASS INDEX DOCD: CPT | Performed by: INTERNAL MEDICINE

## 2024-01-16 PROCEDURE — 99499 UNLISTED E&M SERVICE: CPT | Performed by: INTERNAL MEDICINE

## 2024-01-16 PROCEDURE — 1159F MED LIST DOCD IN RCRD: CPT | Performed by: INTERNAL MEDICINE

## 2024-01-16 PROCEDURE — 96160 PT-FOCUSED HLTH RISK ASSMT: CPT | Performed by: INTERNAL MEDICINE

## 2024-01-16 PROCEDURE — 3078F DIAST BP <80 MM HG: CPT | Performed by: INTERNAL MEDICINE

## 2024-01-16 RX ORDER — ALPRAZOLAM 0.25 MG/1
0.25 TABLET ORAL 2 TIMES DAILY PRN
Qty: 30 TABLET | Refills: 0 | Status: SHIPPED | OUTPATIENT
Start: 2024-01-16

## 2024-01-16 NOTE — PATIENT INSTRUCTIONS
Yomaira Zhang's SCREENING SCHEDULE   Tests on this list are recommended by your physician but may not be covered, or covered at this frequency, by your insurer.   Please check with your insurance carrier before scheduling to verify coverage.   PREVENTATIVE SERVICES FREQUENCY &  COVERAGE DETAILS LAST COMPLETION DATE   Diabetes Screening    Fasting Blood Sugar /  Glucose    One screening every 12 months if never tested or if previously tested but not diagnosed with pre-diabetes   One screening every 6 months if diagnosed with pre-diabetes Lab Results   Component Value Date     (H) 01/11/2024        Cardiovascular Disease Screening    Lipid Panel  Cholesterol  Lipoprotein (HDL)  Triglycerides Covered every 5 years for all Medicare beneficiaries without apparent signs or symptoms of cardiovascular disease Lab Results   Component Value Date    CHOLEST 213 (H) 01/11/2024     (H) 01/11/2024    LDL 65 01/11/2024    TRIG 65 01/11/2024         Electrocardiogram (EKG)   Covered if needed at Welcome to Medicare, and non-screening if indicated for medical reasons -      Ultrasound Screening for Abdominal Aortic Aneurysm (AAA) Covered once in a lifetime for one of the following risk factors   • Men who are 65-75 years old and have ever smoked   • Anyone with a family history 08/30/2017     Colorectal Cancer Screening  Covered for ages 50-85; only need ONE of the following:    Colonoscopy   Covered every 10 years    Covered every 2 years if patient is at high risk or previous colonoscopy was abnormal 11/18/2020    Health Maintenance   Topic Date Due   • Colorectal Cancer Screening  11/18/2025       Flexible Sigmoidoscopy   Covered every 4 years -    Fecal Occult Blood Test Covered annually -   Bone Density Screening    Bone density screening    Covered every 2 years after age 65 if diagnosed with risk of osteoporosis or estrogen deficiency.    Covered yearly for long-term glucocorticoid medication use  (Steroids) Last Dexa Scan:    XR DEXA BONE DENSITOMETRY (CPT=77080) 06/23/2021      No recommendations at this time   Pap and Pelvic    Pap   Covered every 2 years for women at normal risk; Annually if at high risk -  No recommendations at this time    Chlamydia Annually if high risk -  No recommendations at this time   Screening Mammogram    Mammogram     Recommend annually for all female patients aged 40 and older    One baseline mammogram covered for patients aged 35-39 01/04/2023    Health Maintenance   Topic Date Due   • Mammogram  01/04/2024       Immunizations    Influenza Covered once per flu season  Please get every year 10/25/2023  No recommendations at this time    Pneumococcal Each vaccine ([id number] & [id number]) covered once after 65 Prevnar 13: 09/05/2014    [id number]: 01/19/2017     No recommendations at this time    Hepatitis B One screening covered for patients with certain risk factors   -  No recommendations at this time    Tetanus Toxoid Not covered by Medicare Part B unless medically necessary (cut with metal); may be covered with your pharmacy prescription benefits -    Tetanus, Diptheria and Pertusis TD and TDaP Not covered by Medicare Part B -  No recommendations at this time    Zoster Not covered by Medicare Part B; may be covered with your pharmacy  prescription benefits -  Zoster Vaccines(1 of 2) Never done     Annual Monitoring of Persistent Medications (ACE/ARB, digoxin diuretics, anticonvulsants)    Potassium Annually Lab Results   Component Value Date    K 4.2 01/11/2024         Creatinine   Annually Lab Results   Component Value Date    CREATSERUM 0.75 01/11/2024         BUN Annually Lab Results   Component Value Date    BUN 6 (L) 01/11/2024       Drug Serum Conc Annually No results found for: \"DIGOXIN\", \"DIG\", \"VALP\"

## 2024-01-16 NOTE — PROGRESS NOTES
Subjective:   Yomaira Zhang is a 73 year old female who presents for a MA (Medicare Advantage) Supervisit (Once per calendar year) and scheduled follow up of multiple significant but stable problems.   HPI  C/o chest pain and sob few weeks. Intermittent worse activity, no edema  Normal state of health    PAST MEDICAL, SOCIAL, FAMILY HISTORIES REVIEWED WITH PT      History/Other:   Fall Risk Assessment:   She has been screened for Falls and is low risk.      Cognitive Assessment:   She had a completely normal cognitive assessment - see flowsheet entries       Functional Ability/Status:   Yomaira Zhang has some abnormal functions as listed below:  She has Vision problems based on screening of functional status.       Depression Screening (PHQ-2/PHQ-9): PHQ-2 SCORE: 0, done 1/16/2024   [place] Suicide Screening on 1/16/2024 was No Risk.         Advanced Directives:   She does have a Living Will but we do NOT have it on file in Epic.    She does have a POA but we do NOT have it on file in Epic.    Discussed Advance Care Planning with patient (and family/surrogate if present). Standard forms made available to patient in After Visit Summary.      Patient Active Problem List   Diagnosis    History of colon cancer, stage I sigmoid, 2013 s/p resection    Non-seasonal allergic rhinitis due to pollen    Essential hypertension    Age-related osteoporosis without current pathological fracture    10 year risk of MI or stroke 7.5% or greater    Tobacco dependence    SIADH (syndrome of inappropriate ADH production) (HCC)    Abdominal aortic atherosclerosis (HCC)    Hepatitis C antibody test negative    Moderate mixed hyperlipidemia not requiring statin therapy     Allergies:  She is allergic to aspirin, dairy products, dust, pcn [penicillins], and mold.    Current Medications:  Outpatient Medications Marked as Taking for the 1/16/24 encounter (Office Visit) with Nikko Sherwood MD   Medication Sig    Cyanocobalamin  (B-12 OR) Take by mouth.    ALPRAZolam (XANAX) 0.25 MG Oral Tab Take 1 tablet (0.25 mg total) by mouth 2 (two) times daily as needed for Anxiety.    losartan 100 MG Oral Tab Take 1 tablet (100 mg total) by mouth daily.    Calcium Carbonate-Vitamin D 600-125 MG-UNIT Oral Tab Take by mouth 2 (two) times a day.    Cholecalciferol (VITAMIN D) 50 MCG ( UT) Oral Cap Take by mouth daily.    Multiple Vitamin (MULTI-VITAMIN) Oral Tab Take 1 tablet by mouth daily.    loratadine 10 MG Oral Tab Take 1 tablet (10 mg total) by mouth daily as needed.       Medical History:  She  has a past medical history of Abdominal distention (Many years), Allergic rhinitis, Anxiety, Bloating (Many years), Cancer (HCC), Colon cancer (HCC) (), Easy bruising (Have always), Flatulence/gas pain/belching (Many years), Food intolerance (Many years), Heartburn (), Hemorrhoids (6 years gone now), High cholesterol, Irregular bowel habits (4-5 years), Wears glasses (7th grade), and Weight loss.  Surgical History:  She  has a past surgical history that includes colonoscopy (2017); ; tonsillectomy (was a child in grade school); other surgical history (cysts removed from breast, colon cancer surgery); colonoscopy (N/A, 2017); hysterectomy (); Colectomy; total abdom hysterectomy (7-10 years ago); linda localization wire 1 site right (cpt=19281) (Right, ); needle biopsy right (Right, ); and oophorectomy (Bilateral, ).   Family History:  Her family history includes Breast Cancer (age of onset: 40) in her sister; Cancer in her sister; Diabetes in her father; Heart Attack in her father; Hypertension in her father and mother; Other in her mother.  Social History:  She  reports that she has been smoking cigarettes. She has a 15 pack-year smoking history. She has never used smokeless tobacco. She reports current alcohol use. She reports that she does not use drugs.    Tobacco:  She currently smokes tobacco.    Ready to  quit: No  Counseling given: Yes             CAGE Alcohol Screen:   CAGE screening score of 0 on 1/16/2024, showing low risk of alcohol abuse.      Patient Care Team:  Nikko Sherwood MD as PCP - General (Internal Medicine)  Joe Love MD (GASTROENTEROLOGY)    Review of Systems  A comprehensive 10 point review of systems was completed.     Pertinent positives and negatives noted in the HPI.      Objective:   Physical Exam  General: No acute distress. Alert and oriented x 3.  HEENT: Normocephalic atraumatic. Moist mucous membranes. EOM-I. PERRLA. Anicteric.  Neck: No lymphadenopathy.   Respiratory: Clear to auscultation bilaterally. No wheezes. No rhonchi.  Cardiovascular: S1, S2. Regular rate and rhythm. No murmurs, rubs or gallops. Equal pulses.   Abdomen: Soft,  no pain.  nontender, nondistended.  Positive bowel sounds. .  Neurologic: No focal neurological deficits.   Musculoskeletal: Moves all extremities.  Extremities: No edema or cyanosis.  Integument: No rashes or lesions.    Psychiatric: Appropriate mood and affect.      /64   Pulse 101   Temp 97.1 °F (36.2 °C)   Resp 16   Ht 5' 3\" (1.6 m)   Wt 113 lb (51.3 kg)   SpO2 94%   BMI 20.02 kg/m²  Estimated body mass index is 20.02 kg/m² as calculated from the following:    Height as of this encounter: 5' 3\" (1.6 m).    Weight as of this encounter: 113 lb (51.3 kg).    Medicare Hearing Assessment:   Hearing Screening    Screening Method: Whisper Test               Visual Acuity:   Right Eye Visual Acuity: Corrected Right Eye Chart Acuity: 20/20   Left Eye Visual Acuity: Corrected Left Eye Chart Acuity: 20/20   Both Eyes Visual Acuity: Corrected Both Eyes Chart Acuity: 20/20   Able To Tolerate Visual Acuity: Yes        Assessment & Plan:   Yomaira Zhang is a 73 year old female who presents for a Medicare Assessment.     1. Annual physical exam (Primary)  2. Abdominal aortic atherosclerosis (HCC)  3. SIADH (syndrome of inappropriate ADH  production) ([facility])  4. Tobacco dependence  -     CARD NUC STRESS TEST LEXISCAN (CPT=93015/27115/); Future; Expected date: 01/16/2024  -     CT CHEST (CPT=71250); Future; Expected date: 01/16/2024  5. Non-seasonal allergic rhinitis due to pollen  6. Moderate mixed hyperlipidemia not requiring statin therapy  7. History of colon cancer, stage I sigmoid, 2013 s/p resection  8. Essential hypertension  9. Age-related osteoporosis without current pathological fracture  10. 10 year risk of MI or stroke 7.5% or greater  11. Encounter for annual health examination  12. Atypical angina  -     CARD NUC STRESS TEST LEXISCAN (CPT=93015/94161/); Future; Expected date: 01/16/2024  13. SOB (shortness of breath)  -     CT CHEST (CPT=71250); Future; Expected date: 01/16/2024  14. Anxiety about health  -     ALPRAZolam; Take 1 tablet (0.25 mg total) by mouth 2 (two) times daily as needed for Anxiety.  Dispense: 30 tablet; Refill: 0        History of colon cancer, stage I sigmoid, 2013 s/p resection-[facility]     Non-seasonal allergic rhinitis due to pollen- stable. Cont antihistamine     Essential hypertension-controlled. Cont current med therapy     Age-related osteoporosis without current pathological fracture- stable. Cont prolia     10 year risk of MI or stroke 7.5% or greater-stable. continue control of cad risk factors     Tobacco dependence- smoking cessation education given to pt . She declines     SIADH (syndrome of inappropriate ADH production) ([facility])- stable. Cont 1.5 L fluodretention     Abdominal aortic atherosclerosis (HCC)-stable. continue control of cad risk factors     Smokers' cough ([facility])- smoking cessation education given to pt . She declines     Atypical angina and sob -check lexisc and and ct chest    The patient indicates understanding of these issues and agrees to the plan.  Continue with current treatment plan.  Lab work ordered.  Reinforced healthy diet, lifestyle, and exercise.      Return in about 6 months  (around 7/16/2024).     Nikko Sherwood MD, 1/16/2024     Supplementary Documentation:   General Health:  In the past six months, have you lost more than 10 pounds without trying?: 2 - No  Has your appetite been poor?: No  Type of Diet: Other  How does the patient maintain a good energy level?: Daily Walks  How would you describe your daily physical activity?: Moderate  How would you describe your current health state?: Good  How do you maintain positive mental well-being?: Puzzles;Visiting Family  On a scale of 0 to 10, with 0 being no pain and 10 being severe pain, what is your pain level?: 0 - (None)  In the past six months, have you experienced urine leakage?: 0-No  At any time do you feel concerned for the safety/well-being of yourself and/or your children, in your home or elsewhere?: No  Have you had any immunizations at another office such as Influenza, Hepatitis B, Tetanus, or Pneumococcal?: Yes       Yomaira Zhang's SCREENING SCHEDULE   Tests on this list are recommended by your physician but may not be covered, or covered at this frequency, by your insurer.   Please check with your insurance carrier before scheduling to verify coverage.   PREVENTATIVE SERVICES FREQUENCY &  COVERAGE DETAILS LAST COMPLETION DATE   Diabetes Screening    Fasting Blood Sugar /  Glucose    One screening every 12 months if never tested or if previously tested but not diagnosed with pre-diabetes   One screening every 6 months if diagnosed with pre-diabetes Lab Results   Component Value Date     (H) 01/11/2024        Cardiovascular Disease Screening    Lipid Panel  Cholesterol  Lipoprotein (HDL)  Triglycerides Covered every 5 years for all Medicare beneficiaries without apparent signs or symptoms of cardiovascular disease Lab Results   Component Value Date    CHOLEST 213 (H) 01/11/2024     (H) 01/11/2024    LDL 65 01/11/2024    TRIG 65 01/11/2024         Electrocardiogram (EKG)   Covered if needed at Welcome to  Medicare, and non-screening if indicated for medical reasons -      Ultrasound Screening for Abdominal Aortic Aneurysm (AAA) Covered once in a lifetime for one of the following risk factors    Men who are 65-75 years old and have ever smoked    Anyone with a family history 08/30/2017     Colorectal Cancer Screening  Covered for ages 50-85; only need ONE of the following:    Colonoscopy   Covered every 10 years    Covered every 2 years if patient is at high risk or previous colonoscopy was abnormal 11/18/2020    Health Maintenance   Topic Date Due    Colorectal Cancer Screening  11/18/2025       Flexible Sigmoidoscopy   Covered every 4 years -    Fecal Occult Blood Test Covered annually -   Bone Density Screening    Bone density screening    Covered every 2 years after age 65 if diagnosed with risk of osteoporosis or estrogen deficiency.    Covered yearly for long-term glucocorticoid medication use (Steroids) Last Dexa Scan:    XR DEXA BONE DENSITOMETRY (CPT=77080) 06/23/2021      No recommendations at this time   Pap and Pelvic    Pap   Covered every 2 years for women at normal risk; Annually if at high risk -  No recommendations at this time    Chlamydia Annually if high risk -  No recommendations at this time   Screening Mammogram    Mammogram     Recommend annually for all female patients aged 40 and older    One baseline mammogram covered for patients aged 35-39 01/04/2023    Health Maintenance   Topic Date Due    Mammogram  01/04/2024       Immunizations    Influenza Covered once per flu season  Please get every year 10/25/2023  No recommendations at this time    Pneumococcal Each vaccine ([id number] & [id number]) covered once after 65 Prevnar 13: 09/05/2014    [id number]: 01/19/2017     No recommendations at this time    Hepatitis B One screening covered for patients with certain risk factors   -  No recommendations at this time    Tetanus Toxoid Not covered by Medicare Part B unless medically necessary (cut  with metal); may be covered with your pharmacy prescription benefits -    Tetanus, Diptheria and Pertusis TD and TDaP Not covered by Medicare Part B -  No recommendations at this time    Zoster Not covered by Medicare Part B; may be covered with your pharmacy  prescription benefits -  No recommendations at this time     Annual Monitoring of Persistent Medications (ACE/ARB, digoxin diuretics, anticonvulsants)    Potassium Annually Lab Results   Component Value Date    K 4.2 01/11/2024         Creatinine   Annually Lab Results   Component Value Date    CREATSERUM 0.75 01/11/2024         BUN Annually Lab Results   Component Value Date    BUN 6 (L) 01/11/2024       Drug Serum Conc Annually No results found for: \"DIGOXIN\", \"DIG\", \"VALP\"

## 2024-01-18 ENCOUNTER — HOSPITAL ENCOUNTER (OUTPATIENT)
Dept: MAMMOGRAPHY | Age: 74
Discharge: HOME | End: 2024-01-18
Attending: INTERNAL MEDICINE
Payer: MEDICARE

## 2024-01-18 DIAGNOSIS — Z12.31 SCREENING MAMMOGRAM FOR BREAST CANCER: ICD-10-CM

## 2024-01-18 PROCEDURE — 77063 BREAST TOMOSYNTHESIS BI: CPT | Performed by: INTERNAL MEDICINE

## 2024-01-18 PROCEDURE — 77067 SCR MAMMO BI INCL CAD: CPT | Performed by: INTERNAL MEDICINE

## 2024-01-25 ENCOUNTER — HOSPITAL ENCOUNTER (OUTPATIENT)
Dept: CV DIAGNOSTICS | Facility: HOSPITAL | Age: 74
Discharge: HOME | End: 2024-01-25
Attending: INTERNAL MEDICINE
Payer: MEDICARE

## 2024-01-25 ENCOUNTER — HOSPITAL ENCOUNTER (OUTPATIENT)
Dept: CT IMAGING | Facility: HOSPITAL | Age: 74
Discharge: HOME | End: 2024-01-25
Attending: INTERNAL MEDICINE
Payer: MEDICARE

## 2024-01-25 DIAGNOSIS — F17.200 TOBACCO DEPENDENCE: ICD-10-CM

## 2024-01-25 DIAGNOSIS — I20.89 ATYPICAL ANGINA: ICD-10-CM

## 2024-01-25 DIAGNOSIS — R06.02 SOB (SHORTNESS OF BREATH): ICD-10-CM

## 2024-01-25 PROCEDURE — 93017 CV STRESS TEST TRACING ONLY: CPT | Performed by: INTERNAL MEDICINE

## 2024-01-25 PROCEDURE — 78452 HT MUSCLE IMAGE SPECT MULT: CPT | Performed by: INTERNAL MEDICINE

## 2024-01-25 PROCEDURE — 71250 CT THORAX DX C-: CPT | Performed by: INTERNAL MEDICINE

## 2024-01-25 PROCEDURE — 93018 CV STRESS TEST I&R ONLY: CPT | Performed by: INTERNAL MEDICINE

## 2024-01-25 RX ORDER — REGADENOSON 0.08 MG/ML
INJECTION, SOLUTION INTRAVENOUS
Status: COMPLETED
Start: 2024-01-25 | End: 2024-01-25

## 2024-01-25 RX ADMIN — REGADENOSON 0.4 MG: 0.08 INJECTION, SOLUTION INTRAVENOUS at 10:45:00

## 2024-01-26 NOTE — PROGRESS NOTES
Written by Melany Og RN on 1/26/2024  9:46 AM CST View Full Comments  Seen by patient Yomaira LI Zhang on 1/26/2024  2:17 PM

## 2024-01-26 NOTE — PROGRESS NOTES
Mychart comment sent to patient. To monitor that patient reviews.  Do not accept calls from blocked numbers

## 2024-01-26 NOTE — PROGRESS NOTES
Girmahart comment sent to patient. To monitor that patient reviews.  Pt does not take call from blocked number

## 2024-02-12 ENCOUNTER — TELEPHONE (OUTPATIENT)
Dept: ADMINISTRATIVE | Age: 74
End: 2024-02-12

## 2024-02-12 DIAGNOSIS — R91.1 LUNG NODULE: Primary | ICD-10-CM

## 2024-03-12 ENCOUNTER — OFFICE VISIT (OUTPATIENT)
Facility: CLINIC | Age: 74
End: 2024-03-12
Payer: MEDICARE

## 2024-03-12 VITALS
DIASTOLIC BLOOD PRESSURE: 70 MMHG | BODY MASS INDEX: 20.02 KG/M2 | RESPIRATION RATE: 16 BRPM | HEART RATE: 85 BPM | OXYGEN SATURATION: 98 % | HEIGHT: 63 IN | SYSTOLIC BLOOD PRESSURE: 140 MMHG | WEIGHT: 113 LBS

## 2024-03-12 DIAGNOSIS — R91.8 MULTIPLE PULMONARY NODULES: Primary | ICD-10-CM

## 2024-03-12 DIAGNOSIS — I51.5 CARDIAC CALCIFICATION (HCC): ICD-10-CM

## 2024-03-12 DIAGNOSIS — Z72.0 TOBACCO ABUSE: ICD-10-CM

## 2024-03-12 DIAGNOSIS — J43.2 CENTRILOBULAR EMPHYSEMA (HCC): ICD-10-CM

## 2024-03-12 PROCEDURE — 1159F MED LIST DOCD IN RCRD: CPT | Performed by: INTERNAL MEDICINE

## 2024-03-12 PROCEDURE — 3008F BODY MASS INDEX DOCD: CPT | Performed by: INTERNAL MEDICINE

## 2024-03-12 PROCEDURE — 99204 OFFICE O/P NEW MOD 45 MIN: CPT | Performed by: INTERNAL MEDICINE

## 2024-03-12 PROCEDURE — 3077F SYST BP >= 140 MM HG: CPT | Performed by: INTERNAL MEDICINE

## 2024-03-12 PROCEDURE — 1160F RVW MEDS BY RX/DR IN RCRD: CPT | Performed by: INTERNAL MEDICINE

## 2024-03-12 PROCEDURE — 3078F DIAST BP <80 MM HG: CPT | Performed by: INTERNAL MEDICINE

## 2024-03-12 NOTE — H&P
[facility] General Pulmonary Consult Note    History of Present Illness:  Yomaira Zhang is a 73 year old female smoker (30-40 pack years) with significant PMH of HTN, HLD, colon cancer who presents today for evaluation of lung nodules. She reports having a brief episode of dyspnea, chest fluttering sometime ago that was brief, lasted several seconds and attributes to stress.  She denies dyspnea, cough, wheeze, phlegm, chest pain/pressure, pleurisy, f/c/s.  She has smoked since her 20s, has quit at times, but resumes shortly afterward.  Now cutting down and at 1/2 ppd.   Reports having bronchitis nearly every spring that she attributes to allergies - first has nasal congestion/drainage leading to coughing/chest congestion.   Denies previous asthma or COPD.     Past Medical History:   Past Medical History:   Diagnosis Date    Abdominal distention Many years    Allergic rhinitis     Anxiety     Bloating Many years    Cancer (HCC)     Colon cancer (HCC)     surgery    Easy bruising Have always    Flatulence/gas pain/belching Many years    Food intolerance Many years    Heartburn     Hemorrhoids 6 years gone now    High cholesterol     Irregular bowel habits 4-5 years    Wears glasses 7th grade    Weight loss         Past Surgical History:   Past Surgical History:   Procedure Laterality Date    COLECTOMY      COLONOSCOPY  2017    COLONOSCOPY N/A 2017    Procedure: COLONOSCOPY, POSSIBLE BIOPSY, POSSIBLE POLYPECTOMY 40451;  Surgeon: Hu Erazo MD;  [facility]    HYSTERECTOMY      total hystero.    [facility] LOCALIZATION WIRE 1 SITE RIGHT (CPT=19281) Right     cysts removed.    NEEDLE BIOPSY RIGHT Right     benign.          OOPHORECTOMY Bilateral     total hystero.    OTHER SURGICAL HISTORY  cysts removed from breast, colon cancer surgery    TONSILLECTOMY  was a child in grade school    TOTAL ABDOM HYSTERECTOMY  7-10 years ago       Family Medical History:    Family History   Problem Relation Age of Onset    Heart Attack Father     Hypertension Father     Diabetes Father     Hypertension Mother     Other (Other) Mother         copd    Cancer Sister         breast     Breast Cancer Sister 40        In her 40's.        Social History:   Social History     Socioeconomic History    Marital status:      Spouse name: Not on file    Number of children: Not on file    Years of education: Not on file    Highest education level: Not on file   Occupational History    Not on file   Tobacco Use    Smoking status: Every Day     Packs/day: 1.00     Years: 40.00     Additional pack years: 0.00     Total pack years: 40.00     Types: Cigarettes    Smokeless tobacco: Never    Tobacco comments:     Cutting back to 1/5 pack daily   Vaping Use    Vaping Use: Never used   Substance and Sexual Activity    Alcohol use: Yes     Comment: daily 4-5 beers    Drug use: No    Sexual activity: Not on file   Other Topics Concern    Caffeine Concern Yes    Exercise No    Seat Belt Yes    Special Diet No    Stress Concern No    Weight Concern No   Social History Narrative    Not on file     Social Determinants of Health     Financial Resource Strain: Not on file   Food Insecurity: Not on file   Transportation Needs: Not on file   Physical Activity: Not on file   Stress: Not on file   Social Connections: Not on file   Housing Stability: Not on file        Allergies: Aspirin, Dairy products, Dust, Pcn [penicillins], and Mold     Medications:   Current Outpatient Medications   Medication Sig Dispense Refill    B Complex-C-Folic Acid (B COMPLEX + C TR OR) Take 1 tablet by mouth daily.      Multiple Vitamin (ANTIOXIDANT OR) Take by mouth daily.      ALPRAZolam (XANAX) 0.25 MG Oral Tab Take 1 tablet (0.25 mg total) by mouth 2 (two) times daily as needed for Anxiety. 30 tablet 0    losartan 100 MG Oral Tab Take 1 tablet (100 mg total) by mouth daily. 90 tablet 1    Calcium Carbonate-Vitamin D 600-125  MG-UNIT Oral Tab Take by mouth 2 (two) times a day.      Cholecalciferol (VITAMIN D) 50 MCG (2000 UT) Oral Cap Take by mouth daily.      Multiple Vitamin (MULTI-VITAMIN) Oral Tab Take 1 tablet by mouth daily.      loratadine 10 MG Oral Tab Take 1 tablet (10 mg total) by mouth daily as needed.         Review of Systems: Review of Systems   Constitutional: Negative.    HENT: Negative.     Respiratory: Negative.     Cardiovascular: Negative.    All other systems reviewed and are negative.      Physical Exam:  /70 (BP Location: Left arm, Patient Position: Sitting, Cuff Size: adult)   Pulse 85   Resp 16   Ht 5' 3\" (1.6 m)   Wt 113 lb (51.3 kg)   SpO2 98%   BMI 20.02 kg/m²      Constitutional: alert, cooperative. No acute distress.  HEENT: Head NC/AT. Mask in place  Cardio: Regular rate and rhythm. Normal S1 and S2. No murmurs.   Respiratory: Thorax symmetrical with no labored breathing. Clear to ausculation bilaterally with symmetrical breath sounds. No wheezing, rhonchi, rales, or crackles.   GI: NABS. Abd soft, non-tender.  Extremities: No clubbing or cyanosis. No BLE edema.    Neurologic: A&Ox3. No gross motor deficits.  Skin: Warm, dry  Psych: Calm, cooperative. Pleasant affect.    Results:  Personally reviewed  WBC: 5.6, done on 1/11/2024.  HGB: 14.5, done on 1/11/2024.  PLT: 301, done on 1/11/2024.     Glucose: 105, done on 1/11/2024.  Cr: 0.75, done on 1/11/2024.  GFR(AA): 110, done on 6/21/2022.  GFR (non-AA): 95, done on 6/21/2022.  CA: 9, done on 1/11/2024.  Na: 129, done on 1/11/2024.  K: 4.2, done on 1/11/2024.  Cl: 97, done on 1/11/2024.  CO2: 27, done on 1/11/2024.  Last ALB was 3.9% done on 1/11/2024.     CT CHEST (CPT=71250)    Result Date: 1/25/2024  CONCLUSION:  COPD.  1-2 mm nodule abutting the major fissure in the left lower lobe and 1-2 mm nodule in the left upper lobe, images 81 and 41 respectively.  No enlarged adenopathy in the chest.  There is coronary artery calcification.     LOCATION:  [id number]   Dictated by (CST): Summer Zhang MD on 1/25/2024 at 2:15 PM     Finalized by (CST): Summer Zhang MD on 1/25/2024 at 2:20 PM       [facility] A[facility]ENE 2D+3D SCREENING BILAT (CPT=77067/77471)    Result Date: 1/18/2024  CONCLUSION:  No suspicious change from prior mammography.  No mammographic evidence of malignancy.  BI-RADS CATEGORY:  DIAGNOSTIC CATEGORY 1--NEGATIVE ASSESSMENT.   RECOMMENDATIONS:  ROUTINE MAMMOGRAM AND CLINICAL EVALUATION IN 12 MONTHS.   Because of breast density, this patient may benefit from supplemental screening with breast MRI, Molecular Breast Imaging (MBI) or bilateral whole breast ultrasound for increased sensitivity for detection of cancer which can be obscured mammographically.   Please contact your ordering provider to discuss supplemental screening options.  Your patient's answers to the health and family history questions collected during this mammogram indicate a potentially increased risk for breast cancer. It is recommended that this patient be evaluated in our Cancer Risk Assessment Clinic to determine eligibility for additional breast cancer screening, risk reduction strategies, and/or genetic testing. Providers are encouraged to contact our breast navigators at [phone] with any questions or for guidance regarding this recommendation.  A letter explaining the results in lay terms has been sent to the patient.  This exam was evaluated with a computer-aided device.  This patient's information has been entered into a reminder system with a target due date for the next mammogram.   LOCATION:  [facility]   Dictated by (CST): Janice Chung MD on 1/18/2024 at 1:23 PM     Finalized by (CST): Janice Chung MD on 1/18/2024 at 1:25 PM         Assessment/Plan:  #1. Lung nodules  Found incidentally on workup of dyspnea/pain  1/2024 CT chest reviewed showing bilateral emphysematous changes, tiny 1-2mm nodules in left lung/CHIKA. No LAD. +cardiac calcifications   We reviewed her  imaging in detail including differential diagnoses and management. Per fleischner guidelines, she does not require routine testing for these nodules, but could consider optional CT chest in one year. I did advise her to have lung cancer screening scan next year - see below    #2. emphysema  Noted on CT imaging  Active smoker, 30-40 pack years   Strongly advised smoking cessation - smoking cessation counseling provided x 5minutes. She is contemplative but not yet ready to quit  She denies any respiratory symptoms - no dyspnea, cough, wheeze, etc.  Given her asymptomatic status, she does not require inhalers. Advised to contact me should she develop symptoms or other concerns  Consider PFTs in future    #3. Tobacco abuse  Active smoker, 30-40 pack years  Strongly advised smoking cessation - smoking cessation counseling provided x 5minutes. She is contemplative but not yet ready to quit  Next due Jan 25th 2025 or later  Lung Cancer Counseling and Shared Decision Making Session in an Asymptomatic Smoker/Former Smoker   Yomaira Zhang is a 73 year old female without current symptoms of lung cancer.  History   Smoking Status    Every Day    Packs/day: 1.00    Years: 40.00    Types: Cigarettes   Smokeless Tobacco    Never        She received information on the importance of adherence to annual lung cancer Low Dose CT (LDCT) screening, the impact of her comorbidities and her ability or willingness to undergo diagnosis and treatment. she is in agreement and an order will be placed for CT LUNG LD SCREENING(CPT=71271).    We counseled the importance of maintaining cigarette smoking abstinence if she is a former smoker and the importance of smoking cessation if she is a current smoker and we discussed and furnished information about tobacco cessation interventions.      #4. Cardiac calcifications  Noted significant calcifications of coronary vessels on CT chest  Advised to see [facility] cardiology re: CAD; noted recent negative  stress    Naseem Mott MD  3/12/2024

## 2024-03-12 NOTE — PATIENT INSTRUCTIONS
Obtain a CT chest scan (Lung cancer screening scan) around/after January 25, 2025. Call central scheduling at [phone] to schedule the CT scan  Let me know if you develop breathing trouble, cough or other concerns, then let my office know and we would start an inhaler  I would recommend you see a cardiology about the coronary atherosclerosis noted on CT imaging - we work with several cardiologists from [facility] ([facility]) such as Dr. Lagos, Dr. Gallegos and Dr. Min or their partners - call [phone] to schedule  Work on quitting smoking. We reviewed several agents including chantix and wellbutrin. These work best with some form of nicotine replacement (gum, lozenges or patches).  Consider seeing the smoking cessation clinic        [facility]  Smoking Cessation Clinic    If you are a current smoker and are interested in quitting, we are here to help you.    We offer 1:1 counseling with a Nurse Practitioner (includes individualized prescriptive medications as appropriate).    Visits are conducted at one of our [facility] located in [facility], and [place].        Please call [phone] ([facility]) and ask for a Smoking Cessation appointment with:      Danna Cote RN, APN-BC, AOCN ([place])    Jyothi Holbrook RN, APN-BC ([place])    To help you get started, visit  https://www.cdc.gov/tobacco/quit_smoking/how_to_quit/index.htm  https://www.lung.org/quit-smoking/f-ssub-zf-quit  Health Benefits of Quitting Smoking Over Time (cancer.org)

## 2024-03-18 DIAGNOSIS — I10 ESSENTIAL HYPERTENSION: ICD-10-CM

## 2024-03-18 RX ORDER — LOSARTAN POTASSIUM 100 MG/1
100 TABLET ORAL DAILY
Qty: 90 TABLET | Refills: 3 | Status: SHIPPED | OUTPATIENT
Start: 2024-03-18

## 2024-03-18 NOTE — TELEPHONE ENCOUNTER
Last time medication was refilled 7/31/23  Quantity and # of refills 90 w 3   Last OV 1/16/4  Next OV none  Medication protocol failed

## 2024-04-08 ENCOUNTER — TELEPHONE (OUTPATIENT)
Dept: INTERNAL MEDICINE CLINIC | Facility: CLINIC | Age: 74
End: 2024-04-08

## 2024-04-08 DIAGNOSIS — R93.89 ABNORMAL CT OF THE CHEST: ICD-10-CM

## 2024-04-08 DIAGNOSIS — I70.0 ABDOMINAL AORTIC ATHEROSCLEROSIS (HCC): ICD-10-CM

## 2024-04-08 DIAGNOSIS — I10 ESSENTIAL HYPERTENSION: Primary | ICD-10-CM

## 2024-04-08 DIAGNOSIS — E78.2 MODERATE MIXED HYPERLIPIDEMIA NOT REQUIRING STATIN THERAPY: ICD-10-CM

## 2024-04-08 NOTE — TELEPHONE ENCOUNTER
PT Would like to speak with DR almazan's Nurse Regarding A  Heart Referral from Dr Jimenez.    If Dr Almazan's Agrees PT would like a Dr in Her Network, Only if Dr Almazan's agrees with the Diagnosis of Dr Jimenez   PH: [phone]

## 2024-04-09 NOTE — TELEPHONE ENCOUNTER
Patient was seen by Dr. Mott who reviewed her chest CT and recommended being seen by cardiology due to identifiable plaque in coronary artery in imaging. Patient wanting Dr. Sherwood's recommendations and referral if appropriate. Per Dr. Sherwood: Refer to Dr. Gallegos.    Provider Address Phone   Jm Gallegos MD [address] [place] [id number] [phone]

## 2024-04-25 DIAGNOSIS — I10 ESSENTIAL HYPERTENSION: ICD-10-CM

## 2024-04-25 RX ORDER — LOSARTAN POTASSIUM 100 MG/1
100 TABLET ORAL DAILY
Qty: 90 TABLET | Refills: 3 | Status: SHIPPED | OUTPATIENT
Start: 2024-04-25

## 2024-04-25 NOTE — TELEPHONE ENCOUNTER
LAST REFILL 03/18/24  Last time medication was refilled Last OV 01/16/24  Next OV due/scheduled   Future Appointments   Date Time Provider Department Center   7/16/2024 10:45 AM Nikko Sherwood MD [facility] [address]       Failed protocol.     Sent to Dr. Sherwood for approval

## 2024-04-25 NOTE — TELEPHONE ENCOUNTER
Medication requested:   losartan 100 MG Oral Tab [[id number]] (Order [id number])     Dose: SEE ABOVE    Is patient requesting 30 or 90 day supply:  90    Pharmacy name/location:    [id number] - [place] - [facility] [address], [phone], [phone]   [address] [place] [address]   Phone: [phone] Fax: [phone]       LOV:  1/16/24    Is the patient due for appointment: NO (if so, please schedule)    Additional notes:  NA

## 2024-07-03 ENCOUNTER — TELEPHONE (OUTPATIENT)
Dept: INTERNAL MEDICINE CLINIC | Facility: CLINIC | Age: 74
End: 2024-07-03

## 2024-07-03 DIAGNOSIS — I10 ESSENTIAL HYPERTENSION: Primary | ICD-10-CM

## 2024-07-03 NOTE — TELEPHONE ENCOUNTER
[facility]  LOV: 01/16/2024  Next OV & Reason:  07/16/2024- 6mo follow up    Patient was notified to fast 8-10 hours drinking only water/black coffee prior to having labs drawn and may need to submit urine sample.  Hemoglobin A1C will be ordered if patient has diabetes or prediabetes.  Lab orders will be placed by end of day:  yes  Patient requesting A1C: no  Patient informed insurance may not cover A1C and they may be responsible for cost if denied by insurance:  yes  Patient requesting return call:  no

## 2024-07-11 ENCOUNTER — LAB ENCOUNTER (OUTPATIENT)
Dept: LAB | Age: 74
End: 2024-07-11
Attending: INTERNAL MEDICINE
Payer: MEDICARE

## 2024-07-11 DIAGNOSIS — I10 ESSENTIAL HYPERTENSION: ICD-10-CM

## 2024-07-11 LAB
ALBUMIN SERPL-MCNC: 4.1 G/DL (ref 3.4–5)
ALBUMIN/GLOB SERPL: 1.2 {RATIO} (ref 1–2)
ALP LIVER SERPL-CCNC: 91 U/L
ALT SERPL-CCNC: 31 U/L
ANION GAP SERPL CALC-SCNC: 9 MMOL/L (ref 0–18)
AST SERPL-CCNC: 24 U/L (ref 15–37)
BASOPHILS # BLD AUTO: 0.04 X10(3) UL (ref 0–0.2)
BASOPHILS NFR BLD AUTO: 0.7 %
BILIRUB SERPL-MCNC: 0.6 MG/DL (ref 0.1–2)
BILIRUB UR QL STRIP.AUTO: NEGATIVE
BUN BLD-MCNC: 8 MG/DL (ref 9–23)
CALCIUM BLD-MCNC: 9.4 MG/DL (ref 8.5–10.1)
CHLORIDE SERPL-SCNC: 96 MMOL/L (ref 98–112)
CHOLEST SERPL-MCNC: 246 MG/DL (ref ?–200)
CLARITY UR REFRACT.AUTO: CLEAR
CO2 SERPL-SCNC: 25 MMOL/L (ref 21–32)
CREAT BLD-MCNC: 0.65 MG/DL
EGFRCR SERPLBLD CKD-EPI 2021: 93 ML/MIN/1.73M2 (ref 60–?)
EOSINOPHIL # BLD AUTO: 0.11 X10(3) UL (ref 0–0.7)
EOSINOPHIL NFR BLD AUTO: 1.8 %
ERYTHROCYTE [DISTWIDTH] IN BLOOD BY AUTOMATED COUNT: 12.8 %
FASTING PATIENT LIPID ANSWER: YES
FASTING STATUS PATIENT QL REPORTED: YES
GLOBULIN PLAS-MCNC: 3.5 G/DL (ref 2.8–4.4)
GLUCOSE BLD-MCNC: 104 MG/DL (ref 70–99)
GLUCOSE UR STRIP.AUTO-MCNC: NORMAL MG/DL
HCT VFR BLD AUTO: 42.4 %
HDLC SERPL-MCNC: 158 MG/DL (ref 40–59)
HGB BLD-MCNC: 14.6 G/DL
IMM GRANULOCYTES # BLD AUTO: 0.02 X10(3) UL (ref 0–1)
IMM GRANULOCYTES NFR BLD: 0.3 %
KETONES UR STRIP.AUTO-MCNC: NEGATIVE MG/DL
LDLC SERPL CALC-MCNC: 79 MG/DL (ref ?–100)
LEUKOCYTE ESTERASE UR QL STRIP.AUTO: NEGATIVE
LYMPHOCYTES # BLD AUTO: 1.09 X10(3) UL (ref 1–4)
LYMPHOCYTES NFR BLD AUTO: 17.9 %
MCH RBC QN AUTO: 31.7 PG (ref 26–34)
MCHC RBC AUTO-ENTMCNC: 34.4 G/DL (ref 31–37)
MCV RBC AUTO: 92.2 FL
MONOCYTES # BLD AUTO: 0.78 X10(3) UL (ref 0.1–1)
MONOCYTES NFR BLD AUTO: 12.8 %
NEUTROPHILS # BLD AUTO: 4.04 X10 (3) UL (ref 1.5–7.7)
NEUTROPHILS # BLD AUTO: 4.04 X10(3) UL (ref 1.5–7.7)
NEUTROPHILS NFR BLD AUTO: 66.5 %
NITRITE UR QL STRIP.AUTO: NEGATIVE
NONHDLC SERPL-MCNC: 88 MG/DL (ref ?–130)
OSMOLALITY SERPL CALC.SUM OF ELEC: 269 MOSM/KG (ref 275–295)
PH UR STRIP.AUTO: 7 [PH] (ref 5–8)
PLATELET # BLD AUTO: 274 10(3)UL (ref 150–450)
POTASSIUM SERPL-SCNC: 3.9 MMOL/L (ref 3.5–5.1)
PROT SERPL-MCNC: 7.6 G/DL (ref 6.4–8.2)
PROT UR STRIP.AUTO-MCNC: NEGATIVE MG/DL
RBC # BLD AUTO: 4.6 X10(6)UL
RBC UR QL AUTO: NEGATIVE
SODIUM SERPL-SCNC: 130 MMOL/L (ref 136–145)
SP GR UR STRIP.AUTO: 1 (ref 1–1.03)
TRIGL SERPL-MCNC: 51 MG/DL (ref 30–149)
TSI SER-ACNC: 1.47 MIU/ML (ref 0.36–3.74)
UROBILINOGEN UR STRIP.AUTO-MCNC: NORMAL MG/DL
VLDLC SERPL CALC-MCNC: 8 MG/DL (ref 0–30)
WBC # BLD AUTO: 6.1 X10(3) UL (ref 4–11)

## 2024-07-11 PROCEDURE — 80061 LIPID PANEL: CPT

## 2024-07-11 PROCEDURE — 80053 COMPREHEN METABOLIC PANEL: CPT

## 2024-07-11 PROCEDURE — 81003 URINALYSIS AUTO W/O SCOPE: CPT

## 2024-07-11 PROCEDURE — 36415 COLL VENOUS BLD VENIPUNCTURE: CPT

## 2024-07-11 PROCEDURE — 85025 COMPLETE CBC W/AUTO DIFF WBC: CPT

## 2024-07-11 PROCEDURE — 84443 ASSAY THYROID STIM HORMONE: CPT

## 2024-07-15 PROBLEM — R91.8 MULTIPLE PULMONARY NODULES: Status: ACTIVE | Noted: 2024-05-30

## 2024-07-15 NOTE — IMAGING NOTE
Pt called, left detailed voicemail, instructed to arrive 45 minutes prior to gated study scheduled on 7/17 1300.  Park in [facility], eat a light breakfast/lunch, hydrate, hold caffeine/decaff/chocolate x 12 hours prior, hold long acting nitrates, take all meds especially beta blockers prescribed.  Pt encouraged to call with further questions.  Reviewed admin times for 2 metoprolol tartrate tabs

## 2024-07-16 ENCOUNTER — OFFICE VISIT (OUTPATIENT)
Dept: INTERNAL MEDICINE CLINIC | Facility: CLINIC | Age: 74
End: 2024-07-16
Payer: MEDICARE

## 2024-07-16 VITALS
RESPIRATION RATE: 16 BRPM | BODY MASS INDEX: 20.55 KG/M2 | HEART RATE: 78 BPM | WEIGHT: 116 LBS | DIASTOLIC BLOOD PRESSURE: 82 MMHG | HEIGHT: 63 IN | SYSTOLIC BLOOD PRESSURE: 136 MMHG | TEMPERATURE: 98 F | OXYGEN SATURATION: 98 %

## 2024-07-16 DIAGNOSIS — Z91.89 10 YEAR RISK OF MI OR STROKE 7.5% OR GREATER: ICD-10-CM

## 2024-07-16 DIAGNOSIS — F17.200 TOBACCO DEPENDENCE: ICD-10-CM

## 2024-07-16 DIAGNOSIS — E78.2 MODERATE MIXED HYPERLIPIDEMIA NOT REQUIRING STATIN THERAPY: ICD-10-CM

## 2024-07-16 DIAGNOSIS — I10 ESSENTIAL HYPERTENSION: Primary | ICD-10-CM

## 2024-07-16 PROBLEM — Z53.20 STATIN DECLINED: Status: ACTIVE | Noted: 2024-07-16

## 2024-07-16 PROCEDURE — 1160F RVW MEDS BY RX/DR IN RCRD: CPT | Performed by: INTERNAL MEDICINE

## 2024-07-16 PROCEDURE — 1159F MED LIST DOCD IN RCRD: CPT | Performed by: INTERNAL MEDICINE

## 2024-07-16 PROCEDURE — 3008F BODY MASS INDEX DOCD: CPT | Performed by: INTERNAL MEDICINE

## 2024-07-16 PROCEDURE — 99214 OFFICE O/P EST MOD 30 MIN: CPT | Performed by: INTERNAL MEDICINE

## 2024-07-16 PROCEDURE — 3075F SYST BP GE 130 - 139MM HG: CPT | Performed by: INTERNAL MEDICINE

## 2024-07-16 PROCEDURE — 3079F DIAST BP 80-89 MM HG: CPT | Performed by: INTERNAL MEDICINE

## 2024-07-16 PROCEDURE — 1170F FXNL STATUS ASSESSED: CPT | Performed by: INTERNAL MEDICINE

## 2024-07-16 NOTE — PROGRESS NOTES
Subjective:   Patient ID: Yomaira Zhang is a 73 year old female.    HPI  Yomaira Zhang is a 73 year old female.     HPI:   Patient presents for recheck of her hypertension, HLD and tobacco depence. Pt has been taking medications as instructed, no medication side effects, home BP monitoring in the range of 130's systolic and 70's diastolic.  She denies cp or sob      Wt Readings from Last 6 Encounters:   07/16/24 116 lb (52.6 kg)   03/12/24 113 lb (51.3 kg)   01/16/24 113 lb (51.3 kg)   01/10/23 111 lb (50.3 kg)   06/28/22 110 lb (49.9 kg)   12/10/21 99 lb (44.9 kg)     Body mass index is 20.55 kg/m².    Lab Results   Component Value Date    CHOLEST 246 (H) 07/11/2024    CHOLEST 213 (H) 01/11/2024    CHOLEST 252 (H) 01/05/2023     Lab Results   Component Value Date     (H) 07/11/2024     (H) 01/11/2024     (H) 01/05/2023     Lab Results   Component Value Date    TRIG 51 07/11/2024    TRIG 65 01/11/2024    TRIG 71 01/05/2023     Lab Results   Component Value Date    LDL 79 07/11/2024    LDL 65 01/11/2024    LDL 85 01/05/2023     Lab Results   Component Value Date    AST 24 07/11/2024    AST 22 01/11/2024    AST 30 01/05/2023     Lab Results   Component Value Date    ALT 31 07/11/2024    ALT 27 01/11/2024    ALT 29 01/05/2023     Lab Results   Component Value Date     (H) 07/11/2024     (H) 01/11/2024     (H) 01/05/2023       Current Outpatient Medications   Medication Sig Dispense Refill    losartan 100 MG Oral Tab Take 1 tablet (100 mg total) by mouth daily. 90 tablet 3    B Complex-C-Folic Acid (B COMPLEX + C TR OR) Take 1 tablet by mouth daily.      Multiple Vitamin (ANTIOXIDANT OR) Take by mouth daily.      ALPRAZolam (XANAX) 0.25 MG Oral Tab Take 1 tablet (0.25 mg total) by mouth 2 (two) times daily as needed for Anxiety. 30 tablet 0    Calcium Carbonate-Vitamin D 600-125 MG-UNIT Oral Tab Take by mouth 2 (two) times a day.      Cholecalciferol (VITAMIN D) 50  MCG (2000 UT) Oral Cap Take by mouth daily.      Multiple Vitamin (MULTI-VITAMIN) Oral Tab Take 1 tablet by mouth daily.      loratadine 10 MG Oral Tab Take 1 tablet (10 mg total) by mouth daily as needed.        Past Medical History:    Abdominal distention    Allergic rhinitis    Anxiety    Bloating    Cancer (HCC)    Colon cancer (HCC)    surgery    Easy bruising    Flatulence/gas pain/belching    Food intolerance    Heartburn    Hemorrhoids    High cholesterol    Irregular bowel habits    Wears glasses    Weight loss      Past Surgical History:   Procedure Laterality Date    Colectomy      Colonoscopy  2017    Colonoscopy N/A 2017    Procedure: COLONOSCOPY, POSSIBLE BIOPSY, POSSIBLE POLYPECTOMY 18893;  Surgeon: Hu Erazo MD;  [facility]    Hysterectomy      total hystero.    [facility] localization wire 1 site right (cpt=19281) Right     cysts removed.    Needle biopsy right Right     benign.          Oophorectomy Bilateral     total hystero.    Other surgical history  cysts removed from breast, colon cancer surgery    Tonsillectomy  was a child in grade school    Total abdom hysterectomy  7-10 years ago      Social History:    Social History     Socioeconomic History    Marital status:    Tobacco Use    Smoking status: Every Day     Current packs/day: 1.00     Average packs/day: 1 pack/day for 40.0 years (40.0 ttl pk-yrs)     Types: Cigarettes    Smokeless tobacco: Never    Tobacco comments:     Cutting back to 1/5 pack daily   Vaping Use    Vaping status: Never Used   Substance and Sexual Activity    Alcohol use: Yes     Comment: daily 4-5 beers    Drug use: No   Other Topics Concern    Caffeine Concern Yes    Exercise No    Seat Belt Yes    Special Diet No    Stress Concern No    Weight Concern No         REVIEW OF SYSTEMS:   GENERAL HEALTH: feels well otherwise  SKIN: denies any unusual skin lesions or rashes  RESPIRATORY: denies shortness of  breath with exertion  CARDIOVASCULAR: denies chest pain on exertion  GI: denies abdominal pain and denies heartburn  NEURO: denies headaches    EXAM:   /82   Pulse 78   Temp 97.9 °F (36.6 °C)   Resp 16   Ht 5' 3\" (1.6 m)   Wt 116 lb (52.6 kg)   SpO2 98%   BMI 20.55 kg/m²   GENERAL: well developed, well nourished,in no apparent distress  SKIN: no rashes,no suspicious lesions  HEENT: atraumatic, normocephalic,ears and throat are clear  NECK: supple,no adenopathy,no bruits  LUNGS: clear to auscultation  CARDIO: RRR without murmur  GI: good BS's,no masses, HSM or tenderness  EXTREMITIES: no cyanosis, clubbing or edema    ASSESSMENT AND PLAN:   Pt presents for a recheck of her hypertension, HLD and tobacco dependence. BP is well controlled, no significant medication side effects noted.  PLAN: will continue present medications, reviewed diet, exercise and weight control, very strongly urged patient to quit smoking. The patient indicates understanding of these issues and agrees to the plan.  The patient is asked to return in 6 m.    History/Other:   Review of Systems  Current Outpatient Medications   Medication Sig Dispense Refill    losartan 100 MG Oral Tab Take 1 tablet (100 mg total) by mouth daily. 90 tablet 3    B Complex-C-Folic Acid (B COMPLEX + C TR OR) Take 1 tablet by mouth daily.      Multiple Vitamin (ANTIOXIDANT OR) Take by mouth daily.      ALPRAZolam (XANAX) 0.25 MG Oral Tab Take 1 tablet (0.25 mg total) by mouth 2 (two) times daily as needed for Anxiety. 30 tablet 0    Calcium Carbonate-Vitamin D 600-125 MG-UNIT Oral Tab Take by mouth 2 (two) times a day.      Cholecalciferol (VITAMIN D) 50 MCG (2000 UT) Oral Cap Take by mouth daily.      Multiple Vitamin (MULTI-VITAMIN) Oral Tab Take 1 tablet by mouth daily.      loratadine 10 MG Oral Tab Take 1 tablet (10 mg total) by mouth daily as needed.       Allergies:  Allergies   Allergen Reactions    Aspirin HIVES    Dairy Products OTHER (SEE  COMMENTS)     GI upset    Dust OTHER (SEE COMMENTS)     sneezing    Pcn [Penicillins] HIVES    Mold Coughing     sneezing         Objective:   Physical Exam    Assessment & Plan:   1. Essential hypertension    2. Moderate mixed hyperlipidemia not requiring statin therapy    3. Tobacco dependence    4. 10 year risk of MI or stroke 7.5% or greater        No orders of the defined types were placed in this encounter.      Meds This Visit:  Requested Prescriptions      No prescriptions requested or ordered in this encounter       Imaging & Referrals:  None

## 2024-07-17 ENCOUNTER — HOSPITAL ENCOUNTER (OUTPATIENT)
Dept: CT IMAGING | Facility: HOSPITAL | Age: 74
Discharge: HOME | End: 2024-07-17
Attending: INTERNAL MEDICINE
Payer: MEDICARE

## 2024-07-17 VITALS — SYSTOLIC BLOOD PRESSURE: 126 MMHG | HEART RATE: 64 BPM | DIASTOLIC BLOOD PRESSURE: 66 MMHG | RESPIRATION RATE: 23 BRPM

## 2024-07-17 DIAGNOSIS — I25.10 CORONARY ARTERY CALCIFICATION SEEN ON CAT SCAN: ICD-10-CM

## 2024-07-17 PROCEDURE — 75574 CT ANGIO HRT W/3D IMAGE: CPT | Performed by: INTERNAL MEDICINE

## 2024-07-17 RX ORDER — NITROGLYCERIN 0.4 MG/1
TABLET SUBLINGUAL
Status: COMPLETED
Start: 2024-07-17 | End: 2024-07-17

## 2024-07-17 RX ADMIN — NITROGLYCERIN 0.4 MG: 0.4 TABLET SUBLINGUAL at 13:11:00

## 2024-07-17 NOTE — IMAGING NOTE
Yomaira Zhang to CT Rm 4 GE.   O2 applied via nasal cannula @ 2LPM.  Procedure explained and questions answered.   GFR = 93    Contrast injection = 70ml  0.9 NS flush = 100ml  Average HR = 58    Patient tolerated the procedure without complication. Denies any contrast reaction. Discontinued IV saline lock.   Escorted pt to Women's Dressing Room and discharged ambulatory.

## 2024-10-15 ENCOUNTER — LAB ENCOUNTER (OUTPATIENT)
Dept: LAB | Age: 74
End: 2024-10-15
Attending: INTERNAL MEDICINE
Payer: MEDICARE

## 2024-10-15 DIAGNOSIS — E78.2 MODERATE MIXED HYPERLIPIDEMIA NOT REQUIRING STATIN THERAPY: ICD-10-CM

## 2024-10-15 DIAGNOSIS — I25.10 CORONARY ARTERY CALCIFICATION SEEN ON CT SCAN: Primary | ICD-10-CM

## 2024-10-15 LAB
ALT SERPL-CCNC: 26 U/L
AST SERPL-CCNC: 38 U/L (ref ?–34)
CHOLEST SERPL-MCNC: 216 MG/DL (ref ?–200)
FASTING PATIENT LIPID ANSWER: YES
HDLC SERPL-MCNC: 130 MG/DL (ref 40–59)
LDLC SERPL CALC-MCNC: 67 MG/DL (ref ?–100)
NONHDLC SERPL-MCNC: 86 MG/DL (ref ?–130)
TRIGL SERPL-MCNC: 119 MG/DL (ref 30–149)
VLDLC SERPL CALC-MCNC: 18 MG/DL (ref 0–30)

## 2024-10-15 PROCEDURE — 84450 TRANSFERASE (AST) (SGOT): CPT

## 2024-10-15 PROCEDURE — 36415 COLL VENOUS BLD VENIPUNCTURE: CPT

## 2024-10-15 PROCEDURE — 84460 ALANINE AMINO (ALT) (SGPT): CPT

## 2024-10-15 PROCEDURE — 80061 LIPID PANEL: CPT

## 2025-01-28 ENCOUNTER — OFFICE VISIT (OUTPATIENT)
Dept: INTERNAL MEDICINE CLINIC | Facility: CLINIC | Age: 75
End: 2025-01-28
Payer: MEDICARE

## 2025-01-28 VITALS
SYSTOLIC BLOOD PRESSURE: 120 MMHG | DIASTOLIC BLOOD PRESSURE: 84 MMHG | OXYGEN SATURATION: 98 % | WEIGHT: 118 LBS | BODY MASS INDEX: 20.91 KG/M2 | RESPIRATION RATE: 18 BRPM | HEIGHT: 63 IN | TEMPERATURE: 97 F | HEART RATE: 96 BPM

## 2025-01-28 DIAGNOSIS — Z12.31 ENCOUNTER FOR SCREENING MAMMOGRAM FOR MALIGNANT NEOPLASM OF BREAST: ICD-10-CM

## 2025-01-28 DIAGNOSIS — I10 ESSENTIAL HYPERTENSION: ICD-10-CM

## 2025-01-28 DIAGNOSIS — K21.9 GASTROESOPHAGEAL REFLUX DISEASE, UNSPECIFIED WHETHER ESOPHAGITIS PRESENT: Primary | ICD-10-CM

## 2025-01-28 DIAGNOSIS — F17.200 TOBACCO DEPENDENCE: ICD-10-CM

## 2025-01-28 DIAGNOSIS — J43.2 CENTRILOBULAR EMPHYSEMA (HCC): ICD-10-CM

## 2025-01-28 PROBLEM — I51.5 CARDIAC CALCIFICATION (HCC): Status: ACTIVE | Noted: 2025-01-28

## 2025-01-28 PROCEDURE — 1170F FXNL STATUS ASSESSED: CPT

## 2025-01-28 PROCEDURE — 1160F RVW MEDS BY RX/DR IN RCRD: CPT

## 2025-01-28 PROCEDURE — G2211 COMPLEX E/M VISIT ADD ON: HCPCS

## 2025-01-28 PROCEDURE — 99214 OFFICE O/P EST MOD 30 MIN: CPT

## 2025-01-28 PROCEDURE — 3008F BODY MASS INDEX DOCD: CPT

## 2025-01-28 PROCEDURE — 1159F MED LIST DOCD IN RCRD: CPT

## 2025-01-28 PROCEDURE — 3074F SYST BP LT 130 MM HG: CPT

## 2025-01-28 PROCEDURE — 3079F DIAST BP 80-89 MM HG: CPT

## 2025-01-28 RX ORDER — PANTOPRAZOLE SODIUM 40 MG/1
40 TABLET, DELAYED RELEASE ORAL
Qty: 14 TABLET | Refills: 0 | Status: SHIPPED | OUTPATIENT
Start: 2025-01-28 | End: 2025-02-11

## 2025-01-28 RX ORDER — PRAVASTATIN SODIUM 20 MG
20 TABLET ORAL NIGHTLY
COMMUNITY
Start: 2024-10-21

## 2025-01-28 NOTE — PROGRESS NOTES
Yomaira Zhang is a 74 year old female.  HPI:   Pt presents today stating she has an acidic taste in her mouth and a \"burning\" sensation in her stomach. Certain foods (tomatoes) make the symptoms worse.    Gastro-esophageal Reflux  She complains of abdominal pain, heartburn and a sore throat. She reports no belching, no chest pain, no choking, no coughing, no dysphagia, no early satiety, no globus sensation, no hoarse voice, no nausea, no stridor, no tooth decay, no water brash or no wheezing. This is a recurrent problem. The current episode started more than 1 month ago. The problem occurs constantly. The problem has been gradually improving.    Pt states she stopped pravastatin two weeks ago and her symptoms improved, but still persistent. Pt is a daily smoker and daily drinker (states 3 beers per day).      Current Outpatient Medications   Medication Sig Dispense Refill    pantoprazole 40 MG Oral Tab EC Take 1 tablet (40 mg total) by mouth every morning before breakfast for 14 days. 14 tablet 0    losartan 100 MG Oral Tab Take 1 tablet (100 mg total) by mouth daily. 90 tablet 3    B Complex-C-Folic Acid (B COMPLEX + C TR OR) Take 1 tablet by mouth daily.      Multiple Vitamin (ANTIOXIDANT OR) Take by mouth daily.      ALPRAZolam (XANAX) 0.25 MG Oral Tab Take 1 tablet (0.25 mg total) by mouth 2 (two) times daily as needed for Anxiety. 30 tablet 0    Calcium Carbonate-Vitamin D 600-125 MG-UNIT Oral Tab Take by mouth 2 (two) times a day.      Cholecalciferol (VITAMIN D) 50 MCG (2000 UT) Oral Cap Take by mouth daily.      Multiple Vitamin (MULTI-VITAMIN) Oral Tab Take 1 tablet by mouth daily.      pravastatin 20 MG Oral Tab Take 1 tablet (20 mg total) by mouth nightly. (Patient not taking: Reported on 1/28/2025)        Past Medical History:    Abdominal distention    Allergic rhinitis    Anxiety    Bloating    Cancer (HCC)    Colon cancer (HCC)    surgery    Easy bruising    Flatulence/gas pain/belching    Food  intolerance    Heartburn    Hemorrhoids    High cholesterol    Irregular bowel habits    Wears glasses    Weight loss      Social History:  Social History     Socioeconomic History    Marital status:    Tobacco Use    Smoking status: Every Day     Current packs/day: 1.00     Average packs/day: 1 pack/day for 40.0 years (40.0 ttl pk-yrs)     Types: Cigarettes    Smokeless tobacco: Never    Tobacco comments:     Cutting back to 1/5 pack daily   Vaping Use    Vaping status: Never Used   Substance and Sexual Activity    Alcohol use: Yes     Comment: daily 3-4 beers    Drug use: No   Other Topics Concern    Caffeine Concern Yes    Exercise No    Seat Belt Yes    Special Diet No    Stress Concern No    Weight Concern No        REVIEW OF SYSTEMS:   Review of Systems   Constitutional: Negative.    HENT:  Positive for sore throat. Negative for hoarse voice.    Respiratory: Negative.  Negative for cough, choking and wheezing.    Cardiovascular:  Negative for chest pain.   Gastrointestinal:  Positive for abdominal pain and heartburn. Negative for abdominal distention, anal bleeding, blood in stool, constipation, diarrhea, dysphagia, nausea, rectal pain and vomiting.   Neurological: Negative.    Psychiatric/Behavioral: Negative.           EXAM:   /84   Pulse 96   Temp 97.2 °F (36.2 °C) (Temporal)   Resp 18   Ht 5' 3\" (1.6 m)   Wt 118 lb (53.5 kg)   SpO2 98%   BMI 20.90 kg/m²   Physical Exam  Vitals and nursing note reviewed.   Constitutional:       Appearance: Normal appearance. She is normal weight.   Cardiovascular:      Rate and Rhythm: Normal rate and regular rhythm.      Pulses: Normal pulses.      Heart sounds: Normal heart sounds.   Pulmonary:      Effort: Pulmonary effort is normal.      Breath sounds: Normal breath sounds.   Abdominal:      General: Abdomen is flat. Bowel sounds are normal.      Palpations: Abdomen is soft.      Tenderness: There is no abdominal tenderness.   Skin:     General: Skin  is warm and dry.      Capillary Refill: Capillary refill takes less than 2 seconds.   Neurological:      General: No focal deficit present.      Mental Status: She is alert and oriented to person, place, and time. Mental status is at baseline.   Psychiatric:         Mood and Affect: Mood normal.         Behavior: Behavior normal.         Thought Content: Thought content normal.         Judgment: Judgment normal.          ASSESSMENT AND PLAN:   Diagnoses and all orders for this visit:    Gastroesophageal reflux disease, unspecified whether esophagitis present  -     pantoprazole 40 MG Oral Tab EC; Take 1 tablet (40 mg total) by mouth every morning before breakfast for 14 days.  -     Gastro Referral - In Network- for possible EGD. Pt is due to colonoscopy this year too.  -discussed to avoid triggers including smoking, beer    Essential hypertension   -controlled; CPM  Tobacco dependence   -smoking cessation provided. Pt declined  Centrilobular emphysema (HCC)   -follows with pulmonology. Due to repeat CT lung imaging  Encounter for screening mammogram for malignant neoplasm of breast  -     [facility] ARLENE 2D+3D SCREENING BILAT (CPT=77067/15598); Future    Requested Prescriptions     Signed Prescriptions Disp Refills    pantoprazole 40 MG Oral Tab EC 14 tablet 0     Sig: Take 1 tablet (40 mg total) by mouth every morning before breakfast for 14 days.         The patient indicates understanding of these issues and agrees to the plan.  The patient is asked to return if symptoms persist or worsen.

## 2025-02-04 ENCOUNTER — HOSPITAL ENCOUNTER (OUTPATIENT)
Dept: MAMMOGRAPHY | Age: 75
Discharge: HOME | End: 2025-02-04
Payer: MEDICARE

## 2025-02-04 DIAGNOSIS — Z12.31 ENCOUNTER FOR SCREENING MAMMOGRAM FOR MALIGNANT NEOPLASM OF BREAST: ICD-10-CM

## 2025-02-04 PROCEDURE — 77067 SCR MAMMO BI INCL CAD: CPT

## 2025-02-04 PROCEDURE — 77063 BREAST TOMOSYNTHESIS BI: CPT

## 2025-02-05 ENCOUNTER — HOSPITAL ENCOUNTER (OUTPATIENT)
Dept: CT IMAGING | Facility: HOSPITAL | Age: 75
Discharge: HOME | End: 2025-02-05
Attending: INTERNAL MEDICINE
Payer: MEDICARE

## 2025-02-05 DIAGNOSIS — Z72.0 TOBACCO ABUSE: ICD-10-CM

## 2025-02-05 PROCEDURE — 71271 CT THORAX LUNG CANCER SCR C-: CPT | Performed by: INTERNAL MEDICINE

## 2025-02-07 ENCOUNTER — TELEPHONE (OUTPATIENT)
Dept: ADMINISTRATIVE | Age: 75
End: 2025-02-07

## 2025-02-07 DIAGNOSIS — R91.8 MULTIPLE PULMONARY NODULES: ICD-10-CM

## 2025-02-07 DIAGNOSIS — Z09 FOLLOW-UP EXAMINATION: Primary | ICD-10-CM

## 2025-02-07 NOTE — TELEPHONE ENCOUNTER
Patient request new referral to see [facility],please review and sign plan and care if you agree Thank you.    Steffi BAE  [facility].

## 2025-02-14 ENCOUNTER — TELEPHONE (OUTPATIENT)
Dept: INTERNAL MEDICINE CLINIC | Facility: CLINIC | Age: 75
End: 2025-02-14

## 2025-02-14 ENCOUNTER — LAB ENCOUNTER (OUTPATIENT)
Dept: LAB | Age: 75
End: 2025-02-14
Attending: INTERNAL MEDICINE
Payer: MEDICARE

## 2025-02-14 DIAGNOSIS — N30.90 CYSTITIS: Primary | ICD-10-CM

## 2025-02-14 DIAGNOSIS — N30.90 CYSTITIS: ICD-10-CM

## 2025-02-14 DIAGNOSIS — N30.00 ACUTE CYSTITIS WITHOUT HEMATURIA: Primary | ICD-10-CM

## 2025-02-14 LAB
BILIRUB UR QL STRIP.AUTO: NEGATIVE
CLARITY UR REFRACT.AUTO: CLEAR
COLOR UR AUTO: YELLOW
GLUCOSE UR STRIP.AUTO-MCNC: NORMAL MG/DL
HYALINE CASTS #/AREA URNS AUTO: PRESENT /LPF
KETONES UR STRIP.AUTO-MCNC: NEGATIVE MG/DL
LEUKOCYTE ESTERASE UR QL STRIP.AUTO: 500
NITRITE UR QL STRIP.AUTO: NEGATIVE
PH UR STRIP.AUTO: 6.5 [PH] (ref 5–8)
PROT UR STRIP.AUTO-MCNC: NEGATIVE MG/DL
SP GR UR STRIP.AUTO: 1.01 (ref 1–1.03)
UROBILINOGEN UR STRIP.AUTO-MCNC: NORMAL MG/DL
WBC #/AREA URNS AUTO: >50 /HPF

## 2025-02-14 PROCEDURE — 87186 SC STD MICRODIL/AGAR DIL: CPT

## 2025-02-14 PROCEDURE — 87088 URINE BACTERIA CULTURE: CPT

## 2025-02-14 PROCEDURE — 87086 URINE CULTURE/COLONY COUNT: CPT

## 2025-02-14 PROCEDURE — 81001 URINALYSIS AUTO W/SCOPE: CPT

## 2025-02-14 RX ORDER — NITROFURANTOIN 25; 75 MG/1; MG/1
100 CAPSULE ORAL 2 TIMES DAILY
Qty: 10 CAPSULE | Refills: 0 | Status: SHIPPED | OUTPATIENT
Start: 2025-02-14

## 2025-02-14 NOTE — TELEPHONE ENCOUNTER
Patient calling about possibly having a Uti. She requested to have an order placed for her to  the urine cup in lab to test for UTI.

## 2025-02-27 ENCOUNTER — OFFICE VISIT (OUTPATIENT)
Facility: CLINIC | Age: 75
End: 2025-02-27
Payer: MEDICARE

## 2025-02-27 VITALS
RESPIRATION RATE: 16 BRPM | OXYGEN SATURATION: 96 % | WEIGHT: 120 LBS | DIASTOLIC BLOOD PRESSURE: 60 MMHG | HEIGHT: 63 IN | SYSTOLIC BLOOD PRESSURE: 130 MMHG | HEART RATE: 92 BPM | BODY MASS INDEX: 21.26 KG/M2

## 2025-02-27 DIAGNOSIS — J43.2 CENTRILOBULAR EMPHYSEMA (HCC): ICD-10-CM

## 2025-02-27 DIAGNOSIS — F17.210 CIGARETTE SMOKER: ICD-10-CM

## 2025-02-27 DIAGNOSIS — Z72.0 TOBACCO ABUSE: ICD-10-CM

## 2025-02-27 DIAGNOSIS — R91.8 MULTIPLE PULMONARY NODULES: Primary | ICD-10-CM

## 2025-02-27 PROCEDURE — 3075F SYST BP GE 130 - 139MM HG: CPT | Performed by: INTERNAL MEDICINE

## 2025-02-27 PROCEDURE — 3078F DIAST BP <80 MM HG: CPT | Performed by: INTERNAL MEDICINE

## 2025-02-27 PROCEDURE — 99406 BEHAV CHNG SMOKING 3-10 MIN: CPT | Performed by: INTERNAL MEDICINE

## 2025-02-27 PROCEDURE — 3008F BODY MASS INDEX DOCD: CPT | Performed by: INTERNAL MEDICINE

## 2025-02-27 PROCEDURE — 1159F MED LIST DOCD IN RCRD: CPT | Performed by: INTERNAL MEDICINE

## 2025-02-27 PROCEDURE — 1160F RVW MEDS BY RX/DR IN RCRD: CPT | Performed by: INTERNAL MEDICINE

## 2025-02-27 PROCEDURE — 99214 OFFICE O/P EST MOD 30 MIN: CPT | Performed by: INTERNAL MEDICINE

## 2025-02-27 NOTE — PROGRESS NOTES
[facility] General Pulmonary Progress Note    History of Present Illness:  Yomaira Zhang is a 74 year old female  smoker (30-40 pack years) with significant PMH of HTN, HLD, colon cancer who presents today for follow up of lung nodules. Since last visit she feels the same, no dyspnea, cough or pain. Still smoking    2024  She reports having a brief episode of dyspnea, chest fluttering sometime ago that was brief, lasted several seconds and attributes to stress.  She denies dyspnea, cough, wheeze, phlegm, chest pain/pressure, pleurisy, f/c/s.  She has smoked since her 20s, has quit at times, but resumes shortly afterward.  Now cutting down and at 1/2 ppd.   Reports having bronchitis nearly every spring that she attributes to allergies - first has nasal congestion/drainage leading to coughing/chest congestion.   Denies previous asthma or COPD.     Past Medical History:   Past Medical History:    Abdominal distention    Allergic rhinitis    Anxiety    Bloating    Cancer (HCC)    Colon cancer (HCC)    surgery    Easy bruising    Flatulence/gas pain/belching    Food intolerance    Heartburn    Hemorrhoids    High cholesterol    Irregular bowel habits    Wears glasses    Weight loss        Past Surgical History:   Past Surgical History:   Procedure Laterality Date    Colectomy      Colonoscopy  2017    Colonoscopy N/A 2017    Procedure: COLONOSCOPY, POSSIBLE BIOPSY, POSSIBLE POLYPECTOMY 27658;  Surgeon: Hu Erazo MD;  [place] [facility] [place]    Hysterectomy      total hystero.    [facility] localization wire 1 site right (cpt=19281) Right     cysts removed.    Needle biopsy right Right     benign.          Oophorectomy Bilateral     total hystero.    Other surgical history  cysts removed from breast, colon cancer surgery    Tonsillectomy  was a child in grade school    Total abdom hysterectomy  7-10 years ago       Family Medical History:   Family History   Problem Relation  Age of Onset    Heart Attack Father     Hypertension Father     Diabetes Father     Hypertension Mother     Other (Other) Mother         copd    Cancer Sister         breast     Breast Cancer Sister 40        In her 40's.        Social History:   Social History     Socioeconomic History    Marital status:      Spouse name: Not on file    Number of children: Not on file    Years of education: Not on file    Highest education level: Not on file   Occupational History    Not on file   Tobacco Use    Smoking status: Every Day     Current packs/day: 1.00     Average packs/day: 1 pack/day for 49.2 years (49.2 ttl pk-yrs)     Types: Cigarettes     Start date: 1973     Last attempt to quit: 1979    Smokeless tobacco: Never    Tobacco comments:     Cutting back to 1/5 pack daily   Vaping Use    Vaping status: Never Used   Substance and Sexual Activity    Alcohol use: Yes     Comment: daily 3-4 beers    Drug use: No    Sexual activity: Not on file   Other Topics Concern    Caffeine Concern Yes    Exercise No    Seat Belt Yes    Special Diet No    Stress Concern No    Weight Concern No   Social History Narrative    Not on file     Social Drivers of Health     Food Insecurity: Not on file   Transportation Needs: Not on file   Stress: Not on file   Housing Stability: Not on file        Medications:   Current Outpatient Medications   Medication Sig Dispense Refill    nitrofurantoin monohydrate macro 100 MG Oral Cap Take 1 capsule (100 mg total) by mouth 2 (two) times daily. 10 capsule 0    losartan 100 MG Oral Tab Take 1 tablet (100 mg total) by mouth daily. 90 tablet 3    B Complex-C-Folic Acid (B COMPLEX + C TR OR) Take 1 tablet by mouth daily.      Multiple Vitamin (ANTIOXIDANT OR) Take by mouth daily.      ALPRAZolam (XANAX) 0.25 MG Oral Tab Take 1 tablet (0.25 mg total) by mouth 2 (two) times daily as needed for Anxiety. 30 tablet 0    Calcium Carbonate-Vitamin D 600-125 MG-UNIT Oral Tab Take by mouth 2 (two) times a  day.      Cholecalciferol (VITAMIN D) 50 MCG (2000 UT) Oral Cap Take by mouth daily.      Multiple Vitamin (MULTI-VITAMIN) Oral Tab Take 1 tablet by mouth daily.      pravastatin 20 MG Oral Tab Take 1 tablet (20 mg total) by mouth nightly. (Patient not taking: Reported on 2/27/2025)         Review of Systems: Review of Systems   Constitutional: Negative.    HENT: Negative.     Respiratory: Negative.     Cardiovascular: Negative.    All other systems reviewed and are negative.       Physical Exam:  /60 (BP Location: Left arm, Patient Position: Sitting, Cuff Size: adult)   Pulse 92   Resp 16   Ht 5' 3\" (1.6 m)   Wt 120 lb (54.4 kg)   SpO2 96%   BMI 21.26 kg/m²      Constitutional: alert, cooperative. No acute distress.  HEENT: Head NC/AT.    Cardio: Regular rate and rhythm. Normal S1 and S2. No murmurs.   Respiratory: Thorax symmetrical with no labored breathing. Clear to ausculation bilaterally with symmetrical breath sounds. No wheezing, rhonchi, rales, or crackles.   GI: NABS. Abd soft, non-tender.  Extremities: No clubbing or cyanosis. No BLE edema.    Neurologic: A&Ox3. No gross motor deficits.  Skin: Warm, dry  Psych: Calm, cooperative. Pleasant affect.    Results:  Personally reviewed    WBC: 6.1, done on 7/11/2024.  HGB: 14.6, done on 7/11/2024.  PLT: 274, done on 7/11/2024.     Glucose: 104, done on 7/11/2024.  Cr: 0.65, done on 7/11/2024.  Last eGFR was 93 on 7/11/2024.  CA: 9.4, done on 7/11/2024.  Na: 130, done on 7/11/2024.  K: 3.9, done on 7/11/2024.  Cl: 96, done on 7/11/2024.  CO2: 25, done on 7/11/2024.  Last ALB was 4.1% done on 7/11/2024.     CT LUNG LD SCREENING(CPT=71271)    Result Date: 2/5/2025  CONCLUSION:  1. Lung-RADS Category  2:  Negative.  Benign findings with no evidence of primary lung cancer.  2. Lung-RADS Category S:    Negative.  3. Other incidental findings:  Severe coronary artery atherosclerosis is noted.  RECOMMENDATIONS:  LUNG-RADS 2: Continued routine annual LDCT  lung screening.  Suggest next exam on or around 02/05/2026.    CT Lung Screening Reporting and Data System (Lung-RADS 1.1)    Lung Cancer Specific Category   ACR -Guidelines Based Follow-up   Category    Assessment                  Recommendation   0  Incomplete  Further imaging recommended    1  Negative  Routine annual LDCT screen (age <80)   2  Benign appearance or behavior  Routine annual LDCT screen (age <80)   3  Probably benign  Interval short-term diagnostic LDCT (6 months)   4a  Suspicious  Short-term follow-up LDCT or PET/CT (3 months)   4b  Suspicious  Multidisciplinary Conference Review   4x  Suspicious  Category 3 or 4 nodules with other suspicious features   S  Other potentially significant findings  Follow-up based on abnormality  LungRAD scores of 3, 4A, and 4B will be reviewed in the Multidisciplinary Thoracic Oncology Clinic      LOCATION:  [place]    Dictated by (CST): Earle Carter MD on 2/05/2025 at 1:36 PM     Finalized by (CST): Earle Carter MD on 2/05/2025 at 1:39 PM       [facility] ARLENE 2D+3D SCREENING BILAT (CPT=77067/90927)    Result Date: 2/4/2025  CONCLUSION:  No mammographic evidence of malignancy.  BI-RADS CATEGORY:  DIAGNOSTIC CATEGORY 2 - BENIGN FINDING:   RECOMMENDATIONS:  ROUTINE MAMMOGRAM AND CLINICAL EVALUATION IN 12 MONTHS.   Because of breast density, this patient may benefit from supplemental screening with breast MRI, Molecular Breast Imaging (MBI) or bilateral whole breast ultrasound for increased sensitivity for detection of cancer which can be obscured mammographically.   Please contact your ordering provider to discuss supplemental screening options.    A letter explaining the results in lay terms has been sent to the patient.  This exam was evaluated with a computer-aided device.  This patient's information has been entered into a reminder system with a target due date for the next mammogram.   LOCATION:  [place]   Dictated by (CST): Earle Carter MD on 2/04/2025 at 11:24  AM     Finalized by (CST): Earle Carter MD on 2/04/2025 at 11:33 AM   [facility] [place] [address] [phone]      Assessment/Plan:  #1. Lung nodules  Found incidentally on workup of dyspnea/pain  1/2024 CT chest reviewed showing bilateral emphysematous changes, tiny 1-2mm nodules in left lung/CHIKA. No LAD. +cardiac calcifications   2/2025 CT chest with no change  We reviewed her imaging in detail including differential diagnoses and management. Per fleischner guidelines, she does not require routine testing for these nodules. Plan on follow up as below    #2. emphysema  Noted on CT imaging  Active smoker, 30-40 pack years   Strongly advised smoking cessation - smoking cessation counseling provided x 3 minutes. She is contemplative but not yet ready to quit  She denies any respiratory symptoms - no dyspnea, cough, wheeze, etc.  Given her asymptomatic status, she does not require inhalers. Advised to contact me should she develop symptoms or other concerns  Consider PFTs in future    #3. Tobacco abuse  Active smoker, 30-40 pack years  Strongly advised smoking cessation - smoking cessation counseling provided x 3 minutes. She is contemplative but not yet ready to quit  Next due Feb 2026    Lung Cancer Counseling and Shared Decision Making Session in an Asymptomatic Smoker/Former Smoker   Yomaira Zhang is a 74 year old female without current symptoms of lung cancer.  History   Smoking Status    Every Day    Types: Cigarettes   Smokeless Tobacco    Never        She received information on the importance of adherence to annual lung cancer Low Dose CT (LDCT) screening, the impact of her comorbidities and her ability or willingness to undergo diagnosis and treatment. she is in agreement and an order will be placed for CT LUNG LD SCREENING(CPT=71271).    We counseled the importance of maintaining cigarette smoking abstinence if she is a former smoker and the importance of smoking  cessation if she is a current smoker and we discussed and furnished information about tobacco cessation interventions.    Naseem Mott MD  2/27/2025

## 2025-02-27 NOTE — PATIENT INSTRUCTIONS
Obtain a CT chest scan (Lung cancer screening scan) around/after February 5, 2026. Call central scheduling at [phone] to schedule the CT scan  Let me know if you develop breathing trouble, cough or other concerns, then let my office know and we would start an inhaler such as flovent, arnuity or similar medication  Work on quitting smoking. We reviewed medicine such as chantix. You can also use nicotine replacement such as gum, lozenges or patches.  Consider seeing the smoking cessation clinic        [facility]  Smoking Cessation Clinic    If you are a current smoker and are interested in quitting, we are here to help you.    We offer 1:1 counseling with a Nurse Practitioner (includes individualized prescriptive medications as appropriate).    Visits are conducted at one of our [facility] located in [facility], and [place].        Please call [phone] ([facility]) and ask for a Smoking Cessation appointment with:       Jyothi Holbrook RN, APN-BC ([place])    To help you get started, visit  https://www.cdc.gov/tobacco/quit_smoking/how_to_quit/index.htm  https://www.lung.org/quit-smoking/r-yszg-kp-quit  Health Benefits of Quitting Smoking Over Time (cancer.org)

## 2025-04-01 PROBLEM — I25.10 ATHEROSCLEROSIS OF CORONARY ARTERY: Status: ACTIVE | Noted: 2024-05-30

## 2025-04-03 ENCOUNTER — LAB ENCOUNTER (OUTPATIENT)
Dept: LAB | Age: 75
End: 2025-04-03
Attending: INTERNAL MEDICINE
Payer: MEDICARE

## 2025-04-03 DIAGNOSIS — K92.1 MELENA: ICD-10-CM

## 2025-04-03 LAB
BASOPHILS # BLD AUTO: 0.03 X10(3) UL (ref 0–0.2)
BASOPHILS NFR BLD AUTO: 0.5 %
EOSINOPHIL # BLD AUTO: 0.1 X10(3) UL (ref 0–0.7)
EOSINOPHIL NFR BLD AUTO: 1.8 %
ERYTHROCYTE [DISTWIDTH] IN BLOOD BY AUTOMATED COUNT: 12.3 %
HCT VFR BLD AUTO: 38 %
HGB BLD-MCNC: 12.8 G/DL
IMM GRANULOCYTES # BLD AUTO: 0.02 X10(3) UL (ref 0–1)
IMM GRANULOCYTES NFR BLD: 0.4 %
LYMPHOCYTES # BLD AUTO: 1.26 X10(3) UL (ref 1–4)
LYMPHOCYTES NFR BLD AUTO: 22.9 %
MCH RBC QN AUTO: 31.4 PG (ref 26–34)
MCHC RBC AUTO-ENTMCNC: 33.7 G/DL (ref 31–37)
MCV RBC AUTO: 93.4 FL
MONOCYTES # BLD AUTO: 0.71 X10(3) UL (ref 0.1–1)
MONOCYTES NFR BLD AUTO: 12.9 %
NEUTROPHILS # BLD AUTO: 3.38 X10 (3) UL (ref 1.5–7.7)
NEUTROPHILS # BLD AUTO: 3.38 X10(3) UL (ref 1.5–7.7)
NEUTROPHILS NFR BLD AUTO: 61.5 %
PLATELET # BLD AUTO: 256 10(3)UL (ref 150–450)
RBC # BLD AUTO: 4.07 X10(6)UL
WBC # BLD AUTO: 5.5 X10(3) UL (ref 4–11)

## 2025-04-03 PROCEDURE — 36415 COLL VENOUS BLD VENIPUNCTURE: CPT

## 2025-04-03 PROCEDURE — 85025 COMPLETE CBC W/AUTO DIFF WBC: CPT

## 2025-04-21 DIAGNOSIS — I10 ESSENTIAL HYPERTENSION: ICD-10-CM

## 2025-04-21 RX ORDER — LOSARTAN POTASSIUM 100 MG/1
100 TABLET ORAL DAILY
Qty: 90 TABLET | Refills: 0 | Status: SHIPPED | OUTPATIENT
Start: 2025-04-21

## 2025-04-21 NOTE — TELEPHONE ENCOUNTER
Last time medication was refilled 4/25/24  Last office visit  1/28/25  Next office visit due/scheduled   Future Appointments   Date Time Provider Department Center   5/27/2025  1:15 PM Joe Love MD [facility] SUB GI   5/27/2025  1:30 PM Joe Love MD [facility] SUB GI   7/22/2025 11:00 AM Nikko Sherwood MD [facility] [address]   2/26/2026  1:00 PM Naseem Mott MD [facility] [id number] [facility] [place]     Passed protocol, Medication sent.

## 2025-05-27 PROBLEM — R12 CHRONIC HEARTBURN: Status: ACTIVE | Noted: 2025-05-27

## 2025-05-27 PROBLEM — Z86.0101 PERSONAL HISTORY OF ADENOMATOUS AND SERRATED COLON POLYPS: Status: ACTIVE | Noted: 2025-05-27

## 2025-05-27 PROBLEM — Z85.038 PERSONAL HISTORY OF COLON CANCER: Status: ACTIVE | Noted: 2025-05-27

## 2025-05-27 PROCEDURE — 88305 TISSUE EXAM BY PATHOLOGIST: CPT | Performed by: INTERNAL MEDICINE

## 2025-05-29 ENCOUNTER — LAB ENCOUNTER (OUTPATIENT)
Dept: LAB | Age: 75
End: 2025-05-29
Attending: INTERNAL MEDICINE
Payer: MEDICARE

## 2025-05-29 DIAGNOSIS — I10 HYPERTENSION, ESSENTIAL: ICD-10-CM

## 2025-05-29 DIAGNOSIS — I25.10 CORONARY ARTERY CALCIFICATION SEEN ON CAT SCAN: Primary | ICD-10-CM

## 2025-05-29 LAB
ALT SERPL-CCNC: 25 U/L (ref 10–49)
AST SERPL-CCNC: 35 U/L (ref ?–34)
CHOLEST SERPL-MCNC: 204 MG/DL (ref ?–200)
FASTING PATIENT LIPID ANSWER: YES
HDLC SERPL-MCNC: 129 MG/DL (ref 40–59)
LDLC SERPL CALC-MCNC: 63 MG/DL (ref ?–100)
NONHDLC SERPL-MCNC: 75 MG/DL (ref ?–130)
TRIGL SERPL-MCNC: 68 MG/DL (ref 30–149)
VLDLC SERPL CALC-MCNC: 10 MG/DL (ref 0–30)

## 2025-05-29 PROCEDURE — 84450 TRANSFERASE (AST) (SGOT): CPT

## 2025-05-29 PROCEDURE — 80061 LIPID PANEL: CPT

## 2025-05-29 PROCEDURE — 84460 ALANINE AMINO (ALT) (SGPT): CPT

## 2025-05-29 PROCEDURE — 36415 COLL VENOUS BLD VENIPUNCTURE: CPT

## 2025-07-14 ENCOUNTER — TELEPHONE (OUTPATIENT)
Dept: INTERNAL MEDICINE CLINIC | Facility: CLINIC | Age: 75
End: 2025-07-14

## 2025-07-14 DIAGNOSIS — I10 ESSENTIAL HYPERTENSION: Primary | ICD-10-CM

## 2025-07-14 NOTE — TELEPHONE ENCOUNTER
Preferred Lab: [facility]   [facility] location and fax #:  n/a  LOV: 01/28/2025  Next OV & Reason:  07/22/2025-physical   Patient was notified to fast 8-10 hours drinking only water/black coffee prior to having labs drawn and may need to submit urine sample.  Hemoglobin A1C will be ordered if patient has diabetes or prediabetes.  Lab orders will be placed by end of day:  yes  Patient requesting A1C: no  Patient informed insurance may not cover A1C and they may be responsible for cost if denied by insurance:  yes  Patient requesting return call:  no

## 2025-07-17 ENCOUNTER — LAB ENCOUNTER (OUTPATIENT)
Dept: LAB | Age: 75
End: 2025-07-17
Attending: INTERNAL MEDICINE
Payer: MEDICARE

## 2025-07-17 DIAGNOSIS — I10 ESSENTIAL HYPERTENSION: ICD-10-CM

## 2025-07-17 LAB
ALBUMIN SERPL-MCNC: 4.8 G/DL (ref 3.2–4.8)
ALBUMIN/GLOB SERPL: 1.8 {RATIO} (ref 1–2)
ALP LIVER SERPL-CCNC: 79 U/L (ref 55–142)
ALT SERPL-CCNC: 21 U/L (ref 10–49)
ANION GAP SERPL CALC-SCNC: 6 MMOL/L (ref 0–18)
AST SERPL-CCNC: 32 U/L (ref ?–34)
BASOPHILS # BLD AUTO: 0.02 X10(3) UL (ref 0–0.2)
BASOPHILS NFR BLD AUTO: 0.4 %
BILIRUB SERPL-MCNC: 0.5 MG/DL (ref 0.2–1.1)
BILIRUB UR QL STRIP.AUTO: NEGATIVE
BUN BLD-MCNC: 7 MG/DL (ref 9–23)
CALCIUM BLD-MCNC: 9.3 MG/DL (ref 8.7–10.6)
CHLORIDE SERPL-SCNC: 97 MMOL/L (ref 98–112)
CHOLEST SERPL-MCNC: 210 MG/DL (ref ?–200)
CLARITY UR REFRACT.AUTO: CLEAR
CO2 SERPL-SCNC: 29 MMOL/L (ref 21–32)
COLOR UR AUTO: COLORLESS
CREAT BLD-MCNC: 0.72 MG/DL (ref 0.55–1.02)
EGFRCR SERPLBLD CKD-EPI 2021: 88 ML/MIN/1.73M2 (ref 60–?)
EOSINOPHIL # BLD AUTO: 0.1 X10(3) UL (ref 0–0.7)
EOSINOPHIL NFR BLD AUTO: 2.2 %
ERYTHROCYTE [DISTWIDTH] IN BLOOD BY AUTOMATED COUNT: 12.8 %
FASTING PATIENT LIPID ANSWER: YES
FASTING STATUS PATIENT QL REPORTED: YES
GLOBULIN PLAS-MCNC: 2.6 G/DL (ref 2–3.5)
GLUCOSE BLD-MCNC: 112 MG/DL (ref 70–99)
GLUCOSE UR STRIP.AUTO-MCNC: NORMAL MG/DL
HCT VFR BLD AUTO: 41.4 % (ref 35–48)
HDLC SERPL-MCNC: 125 MG/DL (ref 40–59)
HGB BLD-MCNC: 14 G/DL (ref 12–16)
IMM GRANULOCYTES # BLD AUTO: 0.01 X10(3) UL (ref 0–1)
IMM GRANULOCYTES NFR BLD: 0.2 %
KETONES UR STRIP.AUTO-MCNC: NEGATIVE MG/DL
LDLC SERPL CALC-MCNC: 75 MG/DL (ref ?–100)
LEUKOCYTE ESTERASE UR QL STRIP.AUTO: NEGATIVE
LYMPHOCYTES # BLD AUTO: 1.08 X10(3) UL (ref 1–4)
LYMPHOCYTES NFR BLD AUTO: 23.6 %
MCH RBC QN AUTO: 30.9 PG (ref 26–34)
MCHC RBC AUTO-ENTMCNC: 33.8 G/DL (ref 31–37)
MCV RBC AUTO: 91.4 FL (ref 80–100)
MONOCYTES # BLD AUTO: 0.65 X10(3) UL (ref 0.1–1)
MONOCYTES NFR BLD AUTO: 14.2 %
NEUTROPHILS # BLD AUTO: 2.71 X10 (3) UL (ref 1.5–7.7)
NEUTROPHILS # BLD AUTO: 2.71 X10(3) UL (ref 1.5–7.7)
NEUTROPHILS NFR BLD AUTO: 59.4 %
NITRITE UR QL STRIP.AUTO: NEGATIVE
NONHDLC SERPL-MCNC: 85 MG/DL (ref ?–130)
OSMOLALITY SERPL CALC.SUM OF ELEC: 273 MOSM/KG (ref 275–295)
PH UR STRIP.AUTO: 7 [PH] (ref 5–8)
PLATELET # BLD AUTO: 289 10(3)UL (ref 150–450)
POTASSIUM SERPL-SCNC: 4.4 MMOL/L (ref 3.5–5.1)
PROT SERPL-MCNC: 7.4 G/DL (ref 5.7–8.2)
PROT UR STRIP.AUTO-MCNC: NEGATIVE MG/DL
RBC # BLD AUTO: 4.53 X10(6)UL (ref 3.8–5.3)
RBC UR QL AUTO: NEGATIVE
SODIUM SERPL-SCNC: 132 MMOL/L (ref 136–145)
SP GR UR STRIP.AUTO: <1.005 (ref 1–1.03)
TRIGL SERPL-MCNC: 52 MG/DL (ref 30–149)
TSI SER-ACNC: 1.01 UIU/ML (ref 0.55–4.78)
UROBILINOGEN UR STRIP.AUTO-MCNC: NORMAL MG/DL
VLDLC SERPL CALC-MCNC: 8 MG/DL (ref 0–30)
WBC # BLD AUTO: 4.6 X10(3) UL (ref 4–11)

## 2025-07-17 PROCEDURE — 85025 COMPLETE CBC W/AUTO DIFF WBC: CPT

## 2025-07-17 PROCEDURE — 36415 COLL VENOUS BLD VENIPUNCTURE: CPT

## 2025-07-17 PROCEDURE — 80053 COMPREHEN METABOLIC PANEL: CPT

## 2025-07-17 PROCEDURE — 84443 ASSAY THYROID STIM HORMONE: CPT

## 2025-07-17 PROCEDURE — 81003 URINALYSIS AUTO W/O SCOPE: CPT

## 2025-07-17 PROCEDURE — 80061 LIPID PANEL: CPT

## 2025-07-21 PROBLEM — I25.10 ATHEROSCLEROSIS OF CORONARY ARTERY: Status: RESOLVED | Noted: 2024-05-30 | Resolved: 2025-07-21

## 2025-07-21 PROBLEM — Z53.20 STATIN DECLINED: Status: RESOLVED | Noted: 2024-07-16 | Resolved: 2025-07-21

## 2025-07-21 PROBLEM — E78.2 MODERATE MIXED HYPERLIPIDEMIA NOT REQUIRING STATIN THERAPY: Status: RESOLVED | Noted: 2022-06-28 | Resolved: 2025-07-21

## 2025-07-21 PROBLEM — R12 CHRONIC HEARTBURN: Status: RESOLVED | Noted: 2025-05-27 | Resolved: 2025-07-21

## 2025-07-21 PROBLEM — J30.1 NON-SEASONAL ALLERGIC RHINITIS DUE TO POLLEN: Status: RESOLVED | Noted: 2017-01-19 | Resolved: 2025-07-21

## 2025-07-21 NOTE — PROGRESS NOTES
Subjective:   Yomaira Zhang is a 74 year old female who presents for a MA AHA (Medicare Advantage Annual Health Assessment) and Subsequent Annual Wellness visit (Pt already had Initial Annual Wellness) and scheduled follow up of multiple significant but stable problems.             HPI  Feels well   No complaints  Denies cp or sob    PAST MEDICAL, SOCIAL, FAMILY HISTORIES REVIEWED WITH PT    History/Other:   Fall Risk Assessment:   She has been screened for Falls and is low risk.      Cognitive Assessment:   She had a completely normal cognitive assessment - see flowsheet entries       Functional Ability/Status:   Yomaira Zhang has some abnormal functions as listed below:  She has Vision problems based on screening of functional status.       Depression Screening (PHQ):  PHQ-2 SCORE: 0  , done 2025            Advanced Directives:   She does have a Living Will but we do NOT have it on file in Epic.    She does have a POA but we do NOT have it on file in Epic.    Discussed Advance Care Planning with patient (and family/surrogate if present). Standard forms made available to patient in After Visit Summary.      Problem List[1]  Allergies:  She is allergic to naproxen, aspirin, dairy products, dust, pcn [penicillins], and mold.    Current Medications:  Active Meds, Sig Only[2]    Medical History:  She  has a past medical history of Abdominal distention (Many years), Allergic rhinitis, Anxiety, Back pain, Black stools, Bloating (Many years), Body piercing, Cancer (), Colon cancer (HCC) (), Constipation, Easy bruising (Have always), Flatulence/gas pain/belching (Many years), Food intolerance (Many years), Heartburn (2001), Hemorrhoids (6 years gone now), High cholesterol, Irregular bowel habits (4-5 years), Sleep disturbance, Wears glasses (7th grade), Weight gain, and Weight loss.  Surgical History:  She  has a past surgical history that includes colonoscopy (2017); ; tonsillectomy  (was a child in grade school); other surgical history (cysts removed from breast, colon cancer surgery); colonoscopy (N/A, 01/04/2017); hysterectomy (2002); Colectomy; total abdom hysterectomy (7-10 years ago); linda localization wire 1 site right (cpt=19281) (Right, 1974); needle biopsy right (Right, 1978); and oophorectomy (Bilateral, 2002).   Family History:  Her family history includes Breast Cancer (age of onset: 40) in her sister; Cancer in her sister; Diabetes in her father; Heart Attack in her father; Hypertension in her brother, father, mother, and sister; Other in her mother.  Social History:  She  reports that she has been smoking cigarettes. She started smoking about 52 years ago. She has a 49.6 pack-year smoking history. She has never used smokeless tobacco. She reports current alcohol use of about 25.0 standard drinks of alcohol per week. She reports that she does not use drugs.    Tobacco:  Tobacco Use[3]  E-Cigarettes/Vaping       Questions Responses    E-Cigarette Use Never User          E-Cigarette/Vaping Substances       Questions Responses    Nicotine No    THC No    CBD No    Flavoring No          E-Cigarette/Vaping Devices       Questions Responses    Disposable No    Pre-filled or Refillable Cartridge No    Refillable Tank No    Pre-filled Pod No           Tobacco cessation counseling for <3 minutes.      CAGE Alcohol Screen:   CAGE screening score of 0 on 7/22/2025, showing low risk of alcohol abuse.      Patient Care Team:  Nikko Sherwood MD as PCP - General (Internal Medicine)  Joe Love MD (GASTROENTEROLOGY)    Review of Systems  A comprehensive 10 point review of systems was completed.     Pertinent positives and negatives noted in the HPI.      Objective:   Physical Exam  General: No acute distress. Alert and oriented x 3.  HEENT: Normocephalic atraumatic.   Respiratory: Clear to auscultation bilaterally. No wheezes. No rhonchi.  Cardiovascular: S1, S2. Regular rate and rhythm.    Abdomen: Soft,  no pain.  nontender, nondistended.  Positive bowel sounds. .  Extremities: No edema or cyanosis.  Integument: No rashes or lesions.    Psychiatric: Appropriate mood and affect.      /78   Pulse 81   Temp 98 °F (36.7 °C)   Resp 16   Ht 5' 3\" (1.6 m)   Wt 116 lb (52.6 kg)   SpO2 96%   BMI 20.55 kg/m²  Estimated body mass index is 20.55 kg/m² as calculated from the following:    Height as of this encounter: 5' 3\" (1.6 m).    Weight as of this encounter: 116 lb (52.6 kg).    Medicare Hearing Assessment:   Hearing Screening    Screening Method: Whisper Test  Whisper Test Result: Pass               Assessment & Plan:   Yomaira Zhang is a 74 year old female who presents for a Medicare Assessment.     1. Annual physical exam (Primary)  2. Tobacco dependence  3. SIADH (syndrome of inappropriate ADH production) (HCC)  4. Multiple pulmonary nodules  5. History of colon cancer, stage I sigmoid, 2013 s/p resection  6. Essential hypertension  7. Centrilobular emphysema (HCC)  8. Cardiac calcification (HCC)  9. Age-related osteoporosis without current pathological fracture  10. Abdominal aortic atherosclerosis  11. 10 year risk of MI or stroke 7.5% or greater  12. Encounter for annual health examination  13. Encounter for tobacco use cessation counseling  -     Tobacco Cessation Discussion             History of colon cancer, stage I sigmoid, 2013 s/p resection-[facility]UNG     Essential hypertension-controlled. Cont current med therapy     Age-related osteoporosis without current pathological fracture- stable. Cont prolia     10 year risk of MI or stroke 7.5% or greater-stable. continue control of cad risk factors. Cont statin     Tobacco dependence- smoking cessation education given to pt . She declines     SIADH (syndrome of inappropriate ADH production) (HCC)- stable. Cont 1.5 L fluodretention     Abdominal aortic atherosclerosis (HCC)-stable. continue control of cad risk factors     Smokers' cough  (HCC)- smoking cessation education given to pt . She declines    The patient indicates understanding of these issues and agrees to the plan.  Continue with current treatment plan.  Lab work ordered.  Reinforced healthy diet, lifestyle, and exercise.      Return in about 6 months (around 1/22/2026).     Nikko Sherwood MD, 7/21/2025     Supplementary Documentation:   General Health:  In the past six months, have you lost more than 10 pounds without trying?: 2 - No  Has your appetite been poor?: No  Type of Diet: Balanced, Other  How does the patient maintain a good energy level?: Daily Walks  How would you describe your daily physical activity?: Light  How would you describe your current health state?: Good  How do you maintain positive mental well-being?: Puzzles, Visiting Friends, Visiting Family  On a scale of 0 to 10, with 0 being no pain and 10 being severe pain, what is your pain level?: 0 - (None)  In the past six months, have you experienced urine leakage?: 1-Yes  At any time do you feel concerned for the safety/well-being of yourself and/or your children, in your home or elsewhere?: No  Have you had any immunizations at another office such as Influenza, Hepatitis B, Tetanus, or Pneumococcal?: No    Health Maintenance   Topic Date Due    Zoster Vaccines (1 of 2) Never done    Annual Well Visit  01/01/2025    Fall Risk Screening (Annual)  01/01/2025    Tobacco Cessation Counseling  01/01/2025    COVID-19 Vaccine (8 - 2024-25 season) 03/06/2025    Influenza Vaccine (1) 10/01/2025    Mammogram  02/04/2026    Lung Cancer Screening  02/05/2026    Colorectal Cancer Screening  05/27/2030    DEXA Scan  Completed    Annual Depression Screening  Completed    Pneumococcal Vaccine: 50+ Years  Completed    Meningococcal B Vaccine  Aged Out            [1]   Patient Active Problem List  Diagnosis    History of colon cancer, stage I sigmoid, 2013 s/p resection    Essential hypertension    Age-related osteoporosis without  current pathological fracture    10 year risk of MI or stroke 7.5% or greater    Tobacco dependence    SIADH (syndrome of inappropriate ADH production) (HCC)    Abdominal aortic atherosclerosis    Hepatitis C antibody test negative    Multiple pulmonary nodules    Centrilobular emphysema (HCC)    Cardiac calcification (HCC)    Personal history of adenomatous and serrated colon polyps    Personal history of colon cancer   [2]   Outpatient Medications Marked as Taking for the 7/22/25 encounter (Office Visit) with Nikko Sherwood MD   Medication Sig    losartan 100 MG Oral Tab TAKE 1 TABLET BY MOUTH EVERY DAY    pravastatin 20 MG Oral Tab Take 1 tablet (20 mg total) by mouth nightly.    B Complex-C-Folic Acid (B COMPLEX + C TR OR) Take 1 tablet by mouth daily.    Multiple Vitamin (ANTIOXIDANT OR) Take by mouth daily.    ALPRAZolam (XANAX) 0.25 MG Oral Tab Take 1 tablet (0.25 mg total) by mouth 2 (two) times daily as needed for Anxiety.    Calcium Carbonate-Vitamin D 600-125 MG-UNIT Oral Tab Take by mouth 2 (two) times a day.    Cholecalciferol (VITAMIN D) 50 MCG (2000 UT) Oral Cap Take by mouth daily.    Multiple Vitamin (MULTI-VITAMIN) Oral Tab Take 1 tablet by mouth daily.   [3]   Social History  Tobacco Use   Smoking Status Every Day    Current packs/day: 1.00    Average packs/day: 1 pack/day for 49.6 years (49.6 ttl pk-yrs)    Types: Cigarettes    Start date: 1973    Last attempt to quit: 1979   Smokeless Tobacco Never   Tobacco Comments    Cutting back to 1/5 pack daily

## 2025-07-22 ENCOUNTER — OFFICE VISIT (OUTPATIENT)
Dept: INTERNAL MEDICINE CLINIC | Facility: CLINIC | Age: 75
End: 2025-07-22
Payer: MEDICARE

## 2025-07-22 VITALS
HEIGHT: 63 IN | WEIGHT: 116 LBS | BODY MASS INDEX: 20.55 KG/M2 | TEMPERATURE: 98 F | SYSTOLIC BLOOD PRESSURE: 126 MMHG | HEART RATE: 81 BPM | RESPIRATION RATE: 16 BRPM | OXYGEN SATURATION: 96 % | DIASTOLIC BLOOD PRESSURE: 78 MMHG

## 2025-07-22 DIAGNOSIS — Z00.00 ANNUAL PHYSICAL EXAM: Primary | ICD-10-CM

## 2025-07-22 DIAGNOSIS — R91.8 MULTIPLE PULMONARY NODULES: ICD-10-CM

## 2025-07-22 DIAGNOSIS — E22.2 SIADH (SYNDROME OF INAPPROPRIATE ADH PRODUCTION) (HCC): ICD-10-CM

## 2025-07-22 DIAGNOSIS — Z71.6 ENCOUNTER FOR TOBACCO USE CESSATION COUNSELING: ICD-10-CM

## 2025-07-22 DIAGNOSIS — I51.5 CARDIAC CALCIFICATION (HCC): ICD-10-CM

## 2025-07-22 DIAGNOSIS — I10 ESSENTIAL HYPERTENSION: ICD-10-CM

## 2025-07-22 DIAGNOSIS — F17.200 TOBACCO DEPENDENCE: ICD-10-CM

## 2025-07-22 DIAGNOSIS — I70.0 ABDOMINAL AORTIC ATHEROSCLEROSIS: ICD-10-CM

## 2025-07-22 DIAGNOSIS — Z00.00 ENCOUNTER FOR ANNUAL HEALTH EXAMINATION: ICD-10-CM

## 2025-07-22 DIAGNOSIS — Z91.89 10 YEAR RISK OF MI OR STROKE 7.5% OR GREATER: ICD-10-CM

## 2025-07-22 DIAGNOSIS — M81.0 AGE-RELATED OSTEOPOROSIS WITHOUT CURRENT PATHOLOGICAL FRACTURE: ICD-10-CM

## 2025-07-22 DIAGNOSIS — Z85.038 HISTORY OF COLON CANCER, STAGE I: ICD-10-CM

## 2025-07-22 DIAGNOSIS — J43.2 CENTRILOBULAR EMPHYSEMA (HCC): ICD-10-CM

## 2025-07-22 PROCEDURE — 1170F FXNL STATUS ASSESSED: CPT | Performed by: INTERNAL MEDICINE

## 2025-07-22 PROCEDURE — 1159F MED LIST DOCD IN RCRD: CPT | Performed by: INTERNAL MEDICINE

## 2025-07-22 PROCEDURE — 1126F AMNT PAIN NOTED NONE PRSNT: CPT | Performed by: INTERNAL MEDICINE

## 2025-07-22 PROCEDURE — 1160F RVW MEDS BY RX/DR IN RCRD: CPT | Performed by: INTERNAL MEDICINE

## 2025-07-22 PROCEDURE — 96160 PT-FOCUSED HLTH RISK ASSMT: CPT | Performed by: INTERNAL MEDICINE

## 2025-07-22 PROCEDURE — 3008F BODY MASS INDEX DOCD: CPT | Performed by: INTERNAL MEDICINE

## 2025-07-22 PROCEDURE — G0439 PPPS, SUBSEQ VISIT: HCPCS | Performed by: INTERNAL MEDICINE

## 2025-07-22 PROCEDURE — 3078F DIAST BP <80 MM HG: CPT | Performed by: INTERNAL MEDICINE

## 2025-07-22 PROCEDURE — 3074F SYST BP LT 130 MM HG: CPT | Performed by: INTERNAL MEDICINE

## 2025-07-22 RX ORDER — LOSARTAN POTASSIUM 100 MG/1
100 TABLET ORAL DAILY
Qty: 90 TABLET | Refills: 1 | Status: SHIPPED | OUTPATIENT
Start: 2025-07-22

## 2025-08-26 ENCOUNTER — MED MANAGEMENT (OUTPATIENT)
Age: 75
End: 2025-08-26

## (undated) DIAGNOSIS — I10 ESSENTIAL HYPERTENSION: ICD-10-CM

## (undated) DIAGNOSIS — I10 ESSENTIAL HYPERTENSION: Primary | ICD-10-CM

## (undated) NOTE — MR AVS SNAPSHOT
[address]  [address] [id number] [phone]               Thank you for choosing us for your health care visit with Sukhdeep Perez MD.  We are glad to serve you and happy to provide you with this moore substitute for professional medical care. Always follow your healthcare professional's instructions. Follow Up with Our Office     Return in about 2 weeks (around 1/19/2017) for AWV.       Allergies as of Jan 05, 2017     Aspirin Hives    Dairy If you've recently had a stay at the Hospital you can access your discharge instructions in [facility] by going to Visits < Admission Summaries.  If you've been to the Emergency Department or your doctor's office, you can view your past visit information in My

## (undated) NOTE — MR AVS SNAPSHOT
[address]  [address] [id number] [phone]               Thank you for choosing us for your health care visit with Sivan Marcelo MD.  We are glad to serve you and happy to provide you with this moore Breast cancer All women in this age group Yearly mammogram and clinical breast exam1   Cervical cancer Only women who had abnormal screening results before age 72 Talk with your healthcare provider   Chlamydia Women at increased risk for infection At [facility] Chickenpox (varicella) All women in this age group who have no record of this infection or vaccine 2 doses; second dose should be given at least 4 weeks after the first dose   Hepatitis A Women at increased risk for infection – talk with your healthcare pr substitute for professional medical care. Always follow your healthcare professional's instructions. Follow Up with Our Office     Return in about 6 months (around 7/19/2017).       Allergies as of Jan 19, 2017     Aspirin Hives    Dairy Product Scheduling Instructions     Thursday January 19, 2017     Imaging:  ALEXANDRIA CABRERA ([id number])    Instructions: To schedule an appointment for your radiology test please call [facility] [place] Scheduling at [phone].        Thursday January 19, 20 Moderation of alcohol consumption Men: limit to <= 2 drinks* per day. Women and lighter weight persons: limit to <= 1 drink* per day.                            Visit [facility] online at  [email or web address]